# Patient Record
Sex: FEMALE | Race: BLACK OR AFRICAN AMERICAN | NOT HISPANIC OR LATINO | URBAN - METROPOLITAN AREA
[De-identification: names, ages, dates, MRNs, and addresses within clinical notes are randomized per-mention and may not be internally consistent; named-entity substitution may affect disease eponyms.]

---

## 2021-06-23 ENCOUNTER — EMERGENCY (EMERGENCY)
Facility: HOSPITAL | Age: 31
LOS: 0 days | Discharge: HOME | End: 2021-06-23
Attending: EMERGENCY MEDICINE | Admitting: EMERGENCY MEDICINE
Payer: MEDICAID

## 2021-06-23 VITALS
TEMPERATURE: 97 F | HEART RATE: 82 BPM | OXYGEN SATURATION: 100 % | DIASTOLIC BLOOD PRESSURE: 86 MMHG | SYSTOLIC BLOOD PRESSURE: 138 MMHG | RESPIRATION RATE: 18 BRPM

## 2021-06-23 DIAGNOSIS — T74.11XA ADULT PHYSICAL ABUSE, CONFIRMED, INITIAL ENCOUNTER: ICD-10-CM
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
## 2021-06-23 PROCEDURE — 99283 EMERGENCY DEPT VISIT LOW MDM: CPT

## 2021-06-23 RX ORDER — ACETAMINOPHEN 500 MG
1500 TABLET ORAL ONCE
Refills: 0 | Status: COMPLETED | OUTPATIENT
Start: 2021-06-23 | End: 2021-06-23

## 2021-06-23 RX ADMIN — Medication 1500 MILLIGRAM(S): at 05:37
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
## 2021-06-23 NOTE — ED PROVIDER NOTE - PATIENT PORTAL LINK FT
You can access the FollowMyHealth Patient Portal offered by [facility] by registering at the following website: [email or web address]. By joining [employer]’s FollowMyHealth portal, you will also be able to view your health information using other applications (apps) compatible with our system.

## 2021-06-23 NOTE — ED PROVIDER NOTE - NS ED ROS FT
Review of Systems:  	•	CONSTITUTIONAL: no fever, no chills  	•	SKIN: no rash  	•	EYES: no eye pain, no blurry vision  	•	ENT: no change in hearing, no tinnitus   	•	RESPIRATORY: no shortness of breath, no cough  	•	CARDIAC: no chest pain, no palpitations  	•	GI: no nausea, no vomiting   	•	GENITO-URINARY: no discharge, no dysuria; no hematuria, no increased urinary frequency  	•	MUSCULOSKELETAL: no joint paint, no swelling, no redness  	•	NEUROLOGIC: +head injury, no loc, no numbness/paresthesias   	•	PSYCH: no anxiety, non suicidal, non homicidal, no hallucination, no depression
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
## 2021-06-23 NOTE — ED PROVIDER NOTE - PHYSICAL EXAMINATION
CONSTITUTIONAL: Well-developed; well-nourished; in no acute distress, nontoxic appearing  SKIN: skin exam is warm and dry  HEAD: Normocephalic; atraumatic.  EYES: PERRL, 3 mm bilateral, no nystagmus, EOM intact; conjunctiva and sclera clear.  ENT: MMM  NECK: ROM intact. No midline tenderness.   CARD: S1, S2 normal, no murmur  RESP: No wheezes, rales or rhonchi. Good air movement bilaterally  EXT: Normal ROM.   NEURO: awake, alert, following commands, oriented, grossly unremarkable. No Focal deficits. GCS 15.   PSYCH: Cooperative, appropriate.
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
## 2021-06-23 NOTE — ED PROVIDER NOTE - NSFOLLOWUPCLINICS_GEN_ALL_ED_FT
[facility]  Medicine  [address], [place]   Phone: [phone]  Fax:   Follow Up Time: Routine

## 2022-08-29 ENCOUNTER — APPOINTMENT (OUTPATIENT)
Dept: PSYCHIATRY | Facility: CLINIC | Age: 32
End: 2022-08-29

## 2022-08-29 ENCOUNTER — OUTPATIENT (OUTPATIENT)
Dept: OUTPATIENT SERVICES | Facility: HOSPITAL | Age: 32
LOS: 1 days | Discharge: HOME | End: 2022-08-29

## 2022-08-29 DIAGNOSIS — F41.9 ANXIETY DISORDER, UNSPECIFIED: ICD-10-CM

## 2022-08-29 PROBLEM — Z00.00 ENCOUNTER FOR PREVENTIVE HEALTH EXAMINATION: Status: ACTIVE | Noted: 2022-08-29

## 2022-09-28 ENCOUNTER — OUTPATIENT (OUTPATIENT)
Dept: OUTPATIENT SERVICES | Facility: HOSPITAL | Age: 32
LOS: 1 days | Discharge: HOME | End: 2022-09-28

## 2022-09-28 ENCOUNTER — APPOINTMENT (OUTPATIENT)
Dept: PSYCHIATRY | Facility: CLINIC | Age: 32
End: 2022-09-28

## 2022-09-28 DIAGNOSIS — F41.9 ANXIETY DISORDER, UNSPECIFIED: ICD-10-CM

## 2022-09-28 PROCEDURE — 90792 PSYCH DIAG EVAL W/MED SRVCS: CPT

## 2022-10-12 VITALS — WEIGHT: 200 LBS | HEIGHT: 65 IN | BODY MASS INDEX: 33.32 KG/M2

## 2022-10-14 ENCOUNTER — OUTPATIENT (OUTPATIENT)
Dept: OUTPATIENT SERVICES | Facility: HOSPITAL | Age: 32
LOS: 1 days | Discharge: HOME | End: 2022-10-14

## 2022-10-14 ENCOUNTER — APPOINTMENT (OUTPATIENT)
Dept: PSYCHIATRY | Facility: CLINIC | Age: 32
End: 2022-10-14

## 2022-10-14 DIAGNOSIS — F41.9 ANXIETY DISORDER, UNSPECIFIED: ICD-10-CM
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
## 2022-10-14 NOTE — PHYSICAL EXAM
[Cooperative] : cooperative [Depressed] : depressed [Clear] : clear [Linear/Goal Directed] : linear/goal directed [None Reported] : none reported [WNL] : within normal limits [de-identified] : UNABLE TO ASSESS [de-identified] : UNABLE TO ASSESS
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
## 2022-10-14 NOTE — ADDENDUM
[FreeTextEntry1] : PLAN: PT IS ADVISED OF EMERGENCY NUMBERS AND SAFETY PLAN IS DISCUSSED. sHE WILL RETURN TO SESSION IN 1 WEEK

## 2022-10-21 ENCOUNTER — APPOINTMENT (OUTPATIENT)
Dept: PSYCHIATRY | Facility: CLINIC | Age: 32
End: 2022-10-21

## 2022-10-21 ENCOUNTER — OUTPATIENT (OUTPATIENT)
Dept: OUTPATIENT SERVICES | Facility: HOSPITAL | Age: 32
LOS: 1 days | Discharge: HOME | End: 2022-10-21

## 2022-10-21 DIAGNOSIS — F41.9 ANXIETY DISORDER, UNSPECIFIED: ICD-10-CM

## 2022-10-21 NOTE — REASON FOR VISIT
[Home] : at home, [unfilled] , at the time of the visit. [Other Location: e.g. Home (Enter Location, City,State)___] : at [unfilled] [Patient] : Patient [FreeTextEntry1] : PSYCHOTHERAPY FOLLOW UP

## 2022-10-21 NOTE — RISK ASSESSMENT
[No, patient denies ideation or behavior] : No, patient denies ideation or behavior [FreeTextEntry9] : NEGATIVE RISK ASSESSMENT AT THIS TIME

## 2022-10-21 NOTE — PLAN
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

## 2022-10-21 NOTE — PHYSICAL EXAM
[Cooperative] : cooperative [Depressed] : depressed [Clear] : clear [Linear/Goal Directed] : linear/goal directed [None Reported] : none reported [WNL] : within normal limits [Average] : average [de-identified] : UNABLE TO ASSESS [de-identified] : UNABLE TO ASSESS

## 2022-10-26 ENCOUNTER — APPOINTMENT (OUTPATIENT)
Dept: PSYCHIATRY | Facility: CLINIC | Age: 32
End: 2022-10-26

## 2022-10-26 ENCOUNTER — OUTPATIENT (OUTPATIENT)
Dept: OUTPATIENT SERVICES | Facility: HOSPITAL | Age: 32
LOS: 1 days | Discharge: HOME | End: 2022-10-26

## 2022-10-26 DIAGNOSIS — F41.9 ANXIETY DISORDER, UNSPECIFIED: ICD-10-CM

## 2022-10-26 PROCEDURE — 99214 OFFICE O/P EST MOD 30 MIN: CPT | Mod: 95

## 2022-10-26 NOTE — BEHAVIORAL HEALTH
[Prevent physical harm to patient or another individual] : Prevent physical harm to patient or another individual
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
## 2022-10-26 NOTE — CURRENT PSYCHIATRIC SYMPTOMS
[Anhedonia] : anhedonia [Decreased Concentration] : decreased concentrating ability [Euphoria] : euphoria [Highly Irritable] : high irritability [Increased Activity] : increased activity [Distractibility] : distractibility [Talkativeness] : talkativeness [Grandeur] : feelings of grandeur [Buying Sprees] : buying sprees [Hypersexuality] : hypersexuality [Dec Need For Sleep] : decreased need for sleep [Excessive Worry] : excessive worry [Ruminations] : ruminations [Re-experiencing] : re-experiencing [Restlessness] : restlessness [Panic] : panic  [de-identified] : denies, but seems sad [de-identified] : pt denies [de-identified] : denies
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
## 2022-10-26 NOTE — REASON FOR VISIT
[Evaluation] : evaluation of [FreeTextEntry1] : Bipolar disorder/Anxiety, Depression.  [FreeTextEntry2] : Self

## 2022-10-26 NOTE — FAMILY HISTORY
[FreeTextEntry1] : Family composition: Lives alone in a studio apartment. Never  , no kids. \par Family history and background: Born and raised in [place]. Father passed away when she was a little girl, mother is alive. Older brother 45 y/o, and her sister is 51 y/o. \par Family relationship: Good with mother, close relationship with her siblings.  \par Pertinent Family Medical, MH and Substance Use History including Adult Child of Alcoholic and child of substance abuse status; history of cancer and heart disease:\par \par Father: passed away due to a car accident years ago. \par Mother 65 y/o: cholesterol, diabetes. \par \par \par

## 2022-10-26 NOTE — DISCUSSION/SUMMARY
[Low acute suicide risk] : Low acute suicide risk [Yes] : Safety Plan completed/updated (for individuals at risk): Yes [FreeTextEntry3] : safety plan was d/w pt [FreeTextEntry1] : \par \par \par
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
## 2022-10-26 NOTE — PHYSICAL EXAM
[Intermittent] : intermittent [Slowed] : slowed [Euthymic] : euthymic [Constricted] : constricted [Clear] : clear [Poverty of content] : poverty of content [WNL] : within normal limits [None Reported] : none reported [Fund of knowledge] : fund of knowledge [Severe] : severe [None] : none [FreeTextEntry1] : A bit tense  [FreeTextEntry5] : guarded [de-identified] : denies [de-identified] : poor

## 2022-10-27 ENCOUNTER — OUTPATIENT (OUTPATIENT)
Dept: OUTPATIENT SERVICES | Facility: HOSPITAL | Age: 32
LOS: 1 days | Discharge: HOME | End: 2022-10-27

## 2022-10-27 ENCOUNTER — APPOINTMENT (OUTPATIENT)
Dept: PSYCHIATRY | Facility: CLINIC | Age: 32
End: 2022-10-27

## 2022-10-27 DIAGNOSIS — F41.9 ANXIETY DISORDER, UNSPECIFIED: ICD-10-CM

## 2022-10-27 NOTE — PHYSICAL EXAM
[Cooperative] : cooperative [Euthymic] : euthymic [Constricted] : constricted [Clear] : clear [Linear/Goal Directed] : linear/goal directed [None Reported] : none reported [WNL] : within normal limits [Average] : average [de-identified] : UNABLE TO ASSESS [de-identified] : UNABLE TO ASSESS

## 2022-10-27 NOTE — RISK ASSESSMENT
[No, patient denies ideation or behavior] : No, patient denies ideation or behavior [FreeTextEntry9] : NO RISK TO SELF OR OTHERS IS ELICITED AT THIS TIME

## 2022-10-27 NOTE — ADDENDUM
[FreeTextEntry1] : PLAN: weekly therapy continues ..next session scheduled for 11/4\par Pt is to call with any changes or concerns

## 2022-10-27 NOTE — PLAN
[FreeTextEntry2] :    I WANT TO GET A JOB AND RETURN TO WORK [Psychodynamic Therapy] : Psychodynamic Therapy  [Supportive Therapy] : Supportive Therapy [de-identified] : BISI  endorses improved mood. 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   We discussed the possibility of connecting with  through this clinic vocational services. Bisi now prefers to search on her own at this time.\par In sesison Bisi reports her coping tools such as family support and spiritual connection have been instrumental in her recovery.\par She mentions her sister Vanesa  who "has done a lot for me."\par Bisi is very responsive to session and continues to request weekly sessions

## 2022-11-04 ENCOUNTER — APPOINTMENT (OUTPATIENT)
Dept: PSYCHIATRY | Facility: CLINIC | Age: 32
End: 2022-11-04

## 2022-11-04 ENCOUNTER — OUTPATIENT (OUTPATIENT)
Dept: OUTPATIENT SERVICES | Facility: HOSPITAL | Age: 32
LOS: 1 days | Discharge: HOME | End: 2022-11-04

## 2022-11-04 DIAGNOSIS — F41.9 ANXIETY DISORDER, UNSPECIFIED: ICD-10-CM

## 2022-11-08 ENCOUNTER — APPOINTMENT (OUTPATIENT)
Dept: PSYCHIATRY | Facility: CLINIC | Age: 32
End: 2022-11-08

## 2022-11-08 ENCOUNTER — OUTPATIENT (OUTPATIENT)
Dept: OUTPATIENT SERVICES | Facility: HOSPITAL | Age: 32
LOS: 1 days | Discharge: HOME | End: 2022-11-08

## 2022-11-08 DIAGNOSIS — F41.9 ANXIETY DISORDER, UNSPECIFIED: ICD-10-CM

## 2022-11-08 NOTE — RISK ASSESSMENT
[No, patient denies ideation or behavior] : No, patient denies ideation or behavior [FreeTextEntry9] : NEGATIVE RISK TO SELF OR OTHERS IS ELICITED AT THIS TIME

## 2022-11-15 ENCOUNTER — APPOINTMENT (OUTPATIENT)
Dept: PSYCHIATRY | Facility: CLINIC | Age: 32
End: 2022-11-15

## 2022-11-15 ENCOUNTER — OUTPATIENT (OUTPATIENT)
Dept: OUTPATIENT SERVICES | Facility: HOSPITAL | Age: 32
LOS: 1 days | Discharge: HOME | End: 2022-11-15

## 2022-11-15 DIAGNOSIS — F41.9 ANXIETY DISORDER, UNSPECIFIED: ICD-10-CM

## 2022-11-15 NOTE — PLAN
[FreeTextEntry2] : SEE DISCUSSION/SUMMARY [Psychodynamic Therapy] : Psychodynamic Therapy  [Skills training (all types)] : Skills training (all types)  [de-identified] : EDE  ENDORSES STABLE MOOD AND IS RESPONSIVE AN D INTERACTIVE IN SESSION\par PT STATES THAT HER TRIGGERS TO EMOTIONAL EPISODES COME FROM BEING AROUND OTHER PEOPLE SO SHE MAINLY KEEPS TO HERSELF\par WE EXPLORED SOME GOALS AND HOW TO GRADUALLY EXPOSE HERSELF TO OTHERS WHO SHE CONSIDERS SAFE ...SHE WOULD LIKE TO JOIN LGBTQ SUPPORT GROUP BUT IS "NERVOUS"  \par WE DISCUSSED BEGINNING WITH A VIRTUAL SESSION.  EDE IS PROVIDED WITH VALIDATION AND SUPPORT AND AGREES TO CONSIDER THIS OPTION.\par AT THIS TIME SHE KEEPS BUSY WITH FAMILY AS WELL AS DAILY EXERCISE.

## 2022-11-15 NOTE — DISCUSSION/SUMMARY
[Initial Plan] : Initial Plan [Able to set and pursue goals] : able to set and pursue goals [Adherent to treatment recommendations] : adherent to treatment recommendations [Attempting to realize their potential] : Attempting to realize their potential [Awareness of substance use issues] : awareness of substance use issues [Intelligent] : intelligent [Motivated to participate in treatment] : motivated to participate in treatment [Part of a supportive family] : part of a supportive family [Involved in cultural/spiritual/Hoahaoism/community activities] : involved in cultural/spiritual/Hoahaoism/community activities [Connected to healthcare] : connected to healthcare [FreeTextEntry1] : 11/8/2022 [FreeTextEntry2] : 11/8/2023 [FreeTextEntry3] : IN CHART [FreeTextEntry7] : PT IS RESPONSIVE AND COMPLIANT WITH TREATMENT [FreeTextEntry8] : NONE SEEN AT THIS TIME [FreeTextEntry9] : POSITIVE CONNECTION TO SPIRITUALITY [de-identified] : PSYCHIATRIST [Mental Health] : Mental Health [Interpersonal Relationships] : Interpersonal Relationships [Initial] : Initial [every ___ weeks] : every [unfilled] weeks [None - Reason others did not participate:] : None - Reason others did not participate:  [Yes] : Yes [Psychiatric Provider/Prescriber] : Psychiatric Provider/Prescriber [Therapist] : Therapist [FreeTextEntry4] : I WANT TO MANAGE MY ANGER WHICH CAUSES ME TO BE IMPULSIVE AND GET INTO TROUBLE [de-identified] : USE [facility] FOR MEDIITATION  AND EXERCISE [de-identified] : 11/8/2023 [de-identified] : USES [facility] [de-identified] : I WANT TO LOOK FOR WORK [de-identified] : 11/08/2023 [de-identified] : FEELS READY FOR RETURN TO WORK [FreeTextEntry5] : ACTIVE LISTENING AND WEEKLY THERAPY [de-identified] : STABLE MOOD AND RETURN TO WORK [de-identified] : NOT CLINICALLY INDICTATED

## 2022-11-18 ENCOUNTER — APPOINTMENT (OUTPATIENT)
Dept: PSYCHIATRY | Facility: CLINIC | Age: 32
End: 2022-11-18

## 2022-11-29 ENCOUNTER — APPOINTMENT (OUTPATIENT)
Dept: PSYCHIATRY | Facility: CLINIC | Age: 32
End: 2022-11-29

## 2022-11-29 ENCOUNTER — OUTPATIENT (OUTPATIENT)
Dept: OUTPATIENT SERVICES | Facility: HOSPITAL | Age: 32
LOS: 1 days | Discharge: HOME | End: 2022-11-29

## 2022-11-29 DIAGNOSIS — F41.9 ANXIETY DISORDER, UNSPECIFIED: ICD-10-CM

## 2022-11-29 NOTE — PHYSICAL EXAM
[Cooperative] : cooperative [Euthymic] : euthymic [Constricted] : constricted [Clear] : clear [Linear/Goal Directed] : linear/goal directed [None Reported] : none reported [Average] : average [WNL] : within normal limits [de-identified] : PT ENDORSES ONGOING STABLE MOOD

## 2022-11-29 NOTE — DISCUSSION/SUMMARY
[Initial Plan] : Initial Plan [Able to set and pursue goals] : able to set and pursue goals [Adherent to treatment recommendations] : adherent to treatment recommendations [Attempting to realize their potential] : Attempting to realize their potential [Awareness of substance use issues] : awareness of substance use issues [Intelligent] : intelligent [Motivated to participate in treatment] : motivated to participate in treatment [Part of a supportive family] : part of a supportive family [Involved in cultural/spiritual/Pentecostalism/community activities] : involved in cultural/spiritual/Pentecostalism/community activities [Connected to healthcare] : connected to healthcare [FreeTextEntry1] : 11/8/2022 [FreeTextEntry2] : 11/8/2023 [FreeTextEntry3] : IN CHART [FreeTextEntry5] : "I WANT TO GET A JOB AND RETURN TO WORK"  AND I NEED TO MANAGE MY MOODS [FreeTextEntry7] : PT IS RESPONSIVE AND COMPLIANT WITH TREATMENT [FreeTextEntry8] : NONE SEEN AT THIS TIME [FreeTextEntry9] : POSITIVE CONNECTION TO SPIRITUALITY [de-identified] : PSYCHIATRIST

## 2022-11-29 NOTE — PLAN
[FreeTextEntry2] : SEE DISCUSSION/SUMMARY [Cognitive and/or Behavior Therapy] : Cognitive and/or Behavior Therapy  [Skills training (all types)] : Skills training (all types)  [Supportive Therapy] : Supportive Therapy [de-identified] : EDE CONTINUES TO ENDORSE STABLE MOOD AND LOOKS FORWARD TO CELEBRATING HER BIRTHDAY THIS WEEKEND AT THE [employer] GAME WITH HER SIBLINGS.\par WE CONTINUES TO EXPLORE HOW TO USE GRADUAL EXPOSURE THERAPY TO MANAGE HER ANXIETY ABOUT ATTENDING [facility] GROUPS. \par EDE IS RESPONSIVE TO BEGINNING WITH A PHONE CALL\par EDE IS NOW IN A PROGRAM CALLED CRAN THROUGH THE COURT SYSTEM WHICH IS THE LAST STEP BEFORE BEING DISCHARGED FROM THE COURT (MANDATE FOR MENTAL HEALTH TREATMENT.)\par IN SESSION PT CONTINUES TO BE ALERT, INTERACTIVE AND FOCUSED   SHE IS PROVIDED WITH ENCOURAGEMENT NAD SUPPORT

## 2022-11-29 NOTE — RISK ASSESSMENT
[No, patient denies ideation or behavior] : No, patient denies ideation or behavior [FreeTextEntry9] : PT DENIES ANY RISK TO SELF OR OTHERS AT THIS TIME

## 2022-12-06 ENCOUNTER — APPOINTMENT (OUTPATIENT)
Dept: PSYCHIATRY | Facility: CLINIC | Age: 32
End: 2022-12-06

## 2022-12-08 ENCOUNTER — APPOINTMENT (OUTPATIENT)
Dept: PSYCHIATRY | Facility: CLINIC | Age: 32
End: 2022-12-08

## 2022-12-08 ENCOUNTER — OUTPATIENT (OUTPATIENT)
Dept: OUTPATIENT SERVICES | Facility: HOSPITAL | Age: 32
LOS: 1 days | Discharge: HOME | End: 2022-12-08

## 2022-12-08 DIAGNOSIS — F41.9 ANXIETY DISORDER, UNSPECIFIED: ICD-10-CM

## 2022-12-08 PROCEDURE — 99214 OFFICE O/P EST MOD 30 MIN: CPT | Mod: 95
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
## 2022-12-08 NOTE — FAMILY HISTORY
[FreeTextEntry1] : Family composition: Lives alone in a studio apartment. Never  , no kids. \par Family history and background: Born and raised in [place]. Father passed away when she was a little girl, mother is alive. Older brother 47 y/o, and her sister is 49 y/o. \par Family relationship: Good with mother, close relationship with her siblings.  \par Pertinent Family Medical, MH and Substance Use History including Adult Child of Alcoholic and child of substance abuse status; history of cancer and heart disease:\par \par Father: passed away due to a car accident years ago. \par Mother 67 y/o: cholesterol, diabetes. \par \par \par

## 2022-12-08 NOTE — CURRENT PSYCHIATRIC SYMPTOMS
[Anhedonia] : anhedonia [Decreased Concentration] : decreased concentrating ability [Euphoria] : euphoria [Highly Irritable] : high irritability [Increased Activity] : increased activity [Distractibility] : distractibility [Talkativeness] : talkativeness [Grandeur] : feelings of grandeur [Buying Sprees] : buying sprees [Hypersexuality] : hypersexuality [Dec Need For Sleep] : decreased need for sleep [Excessive Worry] : excessive worry [Ruminations] : ruminations [Re-experiencing] : re-experiencing [Restlessness] : restlessness [Panic] : panic  [de-identified] : denies, but seems sad [de-identified] : pt denies [de-identified] : denies
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
## 2022-12-08 NOTE — PHYSICAL EXAM
[Intermittent] : intermittent [Slowed] : slowed [Euthymic] : euthymic [Constricted] : constricted [Clear] : clear [Poverty of content] : poverty of content [WNL] : within normal limits [None] : none [None Reported] : none reported [Fund of knowledge] : fund of knowledge [Severe] : severe [FreeTextEntry1] : A bit more spontaneous [FreeTextEntry5] : less guarded [de-identified] : denies [de-identified] : poor

## 2022-12-13 ENCOUNTER — APPOINTMENT (OUTPATIENT)
Dept: PSYCHIATRY | Facility: CLINIC | Age: 32
End: 2022-12-13

## 2022-12-13 ENCOUNTER — OUTPATIENT (OUTPATIENT)
Dept: OUTPATIENT SERVICES | Facility: HOSPITAL | Age: 32
LOS: 1 days | Discharge: HOME | End: 2022-12-13

## 2022-12-13 DIAGNOSIS — F41.9 ANXIETY DISORDER, UNSPECIFIED: ICD-10-CM

## 2022-12-13 NOTE — DISCUSSION/SUMMARY
[Initial Plan] : Initial Plan [Able to set and pursue goals] : able to set and pursue goals [Adherent to treatment recommendations] : adherent to treatment recommendations [Attempting to realize their potential] : Attempting to realize their potential [Awareness of substance use issues] : awareness of substance use issues [Intelligent] : intelligent [Motivated to participate in treatment] : motivated to participate in treatment [Part of a supportive family] : part of a supportive family [Involved in cultural/spiritual/Anabaptist/community activities] : involved in cultural/spiritual/Anabaptist/community activities [Connected to healthcare] : connected to healthcare [FreeTextEntry1] : 11/8/2022 [FreeTextEntry2] : 11/8/2023 [FreeTextEntry3] : IN CHART [FreeTextEntry5] : "I WANT TO GET A JOB AND RETURN TO WORK"  AND I NEED TO MANAGE MY MOODS [FreeTextEntry7] : PT IS RESPONSIVE AND COMPLIANT WITH TREATMENT [FreeTextEntry8] : NONE SEEN AT THIS TIME [FreeTextEntry9] : POSITIVE CONNECTION TO SPIRITUALITY [de-identified] : PSYCHIATRIST

## 2022-12-13 NOTE — PHYSICAL EXAM
[Cooperative] : cooperative [Euthymic] : euthymic [Constricted] : constricted [Clear] : clear [Linear/Goal Directed] : linear/goal directed [None Reported] : none reported [Average] : average [WNL] : within normal limits [de-identified] : PT ENDORSES ONGOING STABLE MOOD
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
## 2022-12-13 NOTE — RISK ASSESSMENT
[No, patient denies ideation or behavior] : No, patient denies ideation or behavior [FreeTextEntry9] : NO RISK TO SELF OR OTHERS ELICITED

## 2022-12-20 ENCOUNTER — APPOINTMENT (OUTPATIENT)
Dept: PSYCHIATRY | Facility: CLINIC | Age: 32
End: 2022-12-20

## 2022-12-20 ENCOUNTER — OUTPATIENT (OUTPATIENT)
Dept: OUTPATIENT SERVICES | Facility: HOSPITAL | Age: 32
LOS: 1 days | Discharge: HOME | End: 2022-12-20

## 2022-12-20 DIAGNOSIS — F41.9 ANXIETY DISORDER, UNSPECIFIED: ICD-10-CM

## 2022-12-20 NOTE — PLAN
[FreeTextEntry2] : SEE DISCUSSION/SUMMARY [Psychodynamic Therapy] : Psychodynamic Therapy  [Skills training (all types)] : Skills training (all types)  [de-identified] : EDE ENDORSES POSITIVE MOOD AND SHARES AN EXPERIENCE OVER THE WEEKEND, WHEN SHE HAD A DATE\par EDE HAS ALSO VOICED PLANS TO JOIN A GYM AS SHE HAS NOT BEEN EATING HEALTHY AND WOULD LIME TO GET EXERCISE.\par EDE HAS ACTUALLY VOICED HER CONCERNS TO HER SISTER ABOUT EXPERIENCING HER AS "TALKING DOWN TO ME." SHE HAD AGREED TO WRITE A LETTER BUT DECIDED TO ACTUALLY TACKLE THIS.IN PERSON. SHE WAS HAPPY THAT HER SISTER RECEIVED THE MESSAGE IN A POSITIVE MANNER.\par EDE LOOKS FORWARD TO [place] WITH FAMILY\par .SHE IS ALERT, RESPONSIVE AND INTERACTIVE IN SESSION EDE IS PROVIDED WITH SUPPORT AND VALIDATION [FreeTextEntry1] : PLAN: NEXT SESSION IN 2 WEEKS DUE TO UPCOMING HOLIDAY

## 2022-12-20 NOTE — DISCUSSION/SUMMARY
[Initial Plan] : Initial Plan [Able to set and pursue goals] : able to set and pursue goals [Adherent to treatment recommendations] : adherent to treatment recommendations [Attempting to realize their potential] : Attempting to realize their potential [Awareness of substance use issues] : awareness of substance use issues [Intelligent] : intelligent [Motivated to participate in treatment] : motivated to participate in treatment [Part of a supportive family] : part of a supportive family [Involved in cultural/spiritual/Hoahaoism/community activities] : involved in cultural/spiritual/Hoahaoism/community activities [Connected to healthcare] : connected to healthcare [FreeTextEntry1] : 11/8/2022 [FreeTextEntry2] : 11/8/2023 [FreeTextEntry3] : IN CHART [FreeTextEntry5] : "I WANT TO GET A JOB AND RETURN TO WORK"  AND I NEED TO MANAGE MY MOODS [FreeTextEntry7] : PT IS RESPONSIVE AND COMPLIANT WITH TREATMENT [FreeTextEntry8] : NONE SEEN AT THIS TIME [FreeTextEntry9] : POSITIVE CONNECTION TO SPIRITUALITY [de-identified] : PSYCHIATRIST

## 2022-12-20 NOTE — PHYSICAL EXAM
[Cooperative] : cooperative [Euthymic] : euthymic [Constricted] : constricted [Clear] : clear [Linear/Goal Directed] : linear/goal directed [None Reported] : none reported [Average] : average [WNL] : within normal limits [de-identified] : PT ENDORSES ONGOING STABLE MOOD

## 2023-01-03 ENCOUNTER — APPOINTMENT (OUTPATIENT)
Dept: PSYCHIATRY | Facility: CLINIC | Age: 33
End: 2023-01-03

## 2023-01-03 ENCOUNTER — OUTPATIENT (OUTPATIENT)
Dept: OUTPATIENT SERVICES | Facility: HOSPITAL | Age: 33
LOS: 1 days | Discharge: HOME | End: 2023-01-03

## 2023-01-03 DIAGNOSIS — F41.9 ANXIETY DISORDER, UNSPECIFIED: ICD-10-CM

## 2023-01-03 NOTE — PLAN
[FreeTextEntry2] : SEE DISCUSSION/SUMMARY [Psychodynamic Therapy] : Psychodynamic Therapy  [Skills training (all types)] : Skills training (all types)  [de-identified] : EDE ENDORSES GOOD SLEEP AND STABLE MOOD. SHE REPORTS FEELING "BLAH"  WITH NO EVIDENCE OF MAJOR DEPRESSIVE SYMPTOMS\par SHE MAKES GOALS FOR THE NEW YEAR OF BEING MORE CONSISTENT AND STRUCTURED. SHE WOULD LIKE TO EXERCISE REGULARLY AND IS AGREEABLE TO CREATING A REALISTIC SCHEDULE FOR HER WORKOUTS\par EDE HAS NOT BEEN SUCCESSFUL IN MANAGING A SECOND DATE WITH RECENT ROMANTIC CONTACT  AND FEELS SH E STRUGGLES WITH COMMUNICATION   WE DISCUSSED HOW TO EITHER IMPROVE COMMUNICATION OR MOVE FORWARD TO ANOTHER POSSIBLE DATING PARTNER WHO WILL BE MORE RESPONSIVE\par IN SESSION EDE IS ALERT, INTERACTIVE AND COMPLIANT WITH APPOINTMENTS AS WELL AS MEDICATION. SHE IS PROVIDED WITH [employer][PORT\par  [FreeTextEntry1] : PLAN: WEEKLY THERAPY CONTINUES UNTIL NEXT COURT DATE AT END OF JANUARY AT WHICH TIME PT NEEDS WILL BE REEVALUATED

## 2023-01-03 NOTE — RISK ASSESSMENT
[No, patient denies ideation or behavior] : No, patient denies ideation or behavior [FreeTextEntry9] : NO RISK TO SELF OR OTHERS ELICITED AT THIS TIME

## 2023-01-03 NOTE — DISCUSSION/SUMMARY
[Initial Plan] : Initial Plan [Able to set and pursue goals] : able to set and pursue goals [Adherent to treatment recommendations] : adherent to treatment recommendations [Attempting to realize their potential] : Attempting to realize their potential [Awareness of substance use issues] : awareness of substance use issues [Intelligent] : intelligent [Motivated to participate in treatment] : motivated to participate in treatment [Part of a supportive family] : part of a supportive family [Involved in cultural/spiritual/Temple/community activities] : involved in cultural/spiritual/Temple/community activities [Connected to healthcare] : connected to healthcare [FreeTextEntry1] : 11/8/2022 [FreeTextEntry2] : 11/8/2023 [FreeTextEntry3] : IN CHART [FreeTextEntry5] : "I WANT TO GET A JOB AND RETURN TO WORK"  AND I NEED TO MANAGE MY MOODS [FreeTextEntry7] : PT IS RESPONSIVE AND COMPLIANT WITH TREATMENT [FreeTextEntry8] : NONE SEEN AT THIS TIME [FreeTextEntry9] : POSITIVE CONNECTION TO SPIRITUALITY [de-identified] : PSYCHIATRIST

## 2023-01-03 NOTE — PHYSICAL EXAM
[Cooperative] : cooperative [Euthymic] : euthymic [Constricted] : constricted [Clear] : clear [Linear/Goal Directed] : linear/goal directed [None Reported] : none reported [Average] : average [WNL] : within normal limits [de-identified] : PT ENDORSES ONGOING STABLE MOOD

## 2023-01-03 NOTE — REASON FOR VISIT
[Home] : at home, [unfilled] , at the time of the visit. [Patient] : Patient [FreeTextEntry1] : PSYCHOTHERAPY FOLLOW UP

## 2023-01-10 ENCOUNTER — APPOINTMENT (OUTPATIENT)
Dept: PSYCHIATRY | Facility: CLINIC | Age: 33
End: 2023-01-10

## 2023-01-19 ENCOUNTER — OUTPATIENT (OUTPATIENT)
Dept: OUTPATIENT SERVICES | Facility: HOSPITAL | Age: 33
LOS: 1 days | Discharge: HOME | End: 2023-01-19

## 2023-01-19 ENCOUNTER — APPOINTMENT (OUTPATIENT)
Dept: PSYCHIATRY | Facility: CLINIC | Age: 33
End: 2023-01-19

## 2023-01-19 DIAGNOSIS — F41.9 ANXIETY DISORDER, UNSPECIFIED: ICD-10-CM

## 2023-01-19 NOTE — REASON FOR VISIT
[Patient preference] : as per patient preference [Continuing, patient not seen in-person within last 12 months (provide details below)] : Telehealth services are continuing, patient not seen in-person within last 12 months.  [Telehealth (audio & video) - Individual/Group] : This visit was provided via telehealth using real-time 2-way audio visual technology. [Other Location: e.g. Home (Enter Location, City,State)___] : The provider was located at [unfilled]. [Home] : The patient, [unfilled], was located at home, [unfilled], at the time of the visit. [Verbal consent obtained from patient/other participant(s)] : Verbal consent for telehealth/telephonic services obtained from patient/other participant(s) [FreeTextEntry4] : 11:30 AM [FreeTextEntry5] : 12 noon [Patient] : Patient [FreeTextEntry1] : PSYCHOTHERAPY FOLLOW UP
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
## 2023-01-19 NOTE — DISCUSSION/SUMMARY
[Initial Plan] : Initial Plan [Able to set and pursue goals] : able to set and pursue goals [Adherent to treatment recommendations] : adherent to treatment recommendations [Attempting to realize their potential] : Attempting to realize their potential [Awareness of substance use issues] : awareness of substance use issues [Intelligent] : intelligent [Motivated to participate in treatment] : motivated to participate in treatment [Part of a supportive family] : part of a supportive family [Involved in cultural/spiritual/Rastafari/community activities] : involved in cultural/spiritual/Rastafari/community activities [Connected to healthcare] : connected to healthcare [FreeTextEntry1] : 11/8/2022 [FreeTextEntry2] : 11/8/2023 [FreeTextEntry3] : IN CHART [FreeTextEntry5] : "I WANT TO GET A JOB AND RETURN TO WORK"  AND I NEED TO MANAGE MY MOODS [FreeTextEntry7] : PT IS RESPONSIVE AND COMPLIANT WITH TREATMENT [FreeTextEntry8] : NONE SEEN AT THIS TIME [FreeTextEntry9] : POSITIVE CONNECTION TO SPIRITUALITY [de-identified] : PSYCHIATRIST

## 2023-01-19 NOTE — PHYSICAL EXAM
[Cooperative] : cooperative [Euthymic] : euthymic [Constricted] : constricted [Clear] : clear [Linear/Goal Directed] : linear/goal directed [None Reported] : none reported [Average] : average [WNL] : within normal limits [de-identified] : PT ENDORSES ONGOING STABLE MOOD

## 2023-01-24 ENCOUNTER — APPOINTMENT (OUTPATIENT)
Dept: PSYCHIATRY | Facility: CLINIC | Age: 33
End: 2023-01-24

## 2023-01-24 ENCOUNTER — OUTPATIENT (OUTPATIENT)
Dept: OUTPATIENT SERVICES | Facility: HOSPITAL | Age: 33
LOS: 1 days | Discharge: HOME | End: 2023-01-24

## 2023-01-24 DIAGNOSIS — F41.9 ANXIETY DISORDER, UNSPECIFIED: ICD-10-CM

## 2023-01-24 NOTE — RISK ASSESSMENT
[No, patient denies ideation or behavior] : No, patient denies ideation or behavior [FreeTextEntry9] : Pt denies risk to self or others at this time

## 2023-01-24 NOTE — DISCUSSION/SUMMARY
[Initial Plan] : Initial Plan [Able to set and pursue goals] : able to set and pursue goals [Adherent to treatment recommendations] : adherent to treatment recommendations [Attempting to realize their potential] : Attempting to realize their potential [Awareness of substance use issues] : awareness of substance use issues [Intelligent] : intelligent [Motivated to participate in treatment] : motivated to participate in treatment [Part of a supportive family] : part of a supportive family [Involved in cultural/spiritual/Roman Catholic/community activities] : involved in cultural/spiritual/Roman Catholic/community activities [Connected to healthcare] : connected to healthcare [FreeTextEntry1] : 11/8/2022 [FreeTextEntry2] : 11/8/2023 [FreeTextEntry3] : IN CHART [FreeTextEntry5] : "I WANT TO GET A JOB AND RETURN TO WORK"  AND I NEED TO MANAGE MY MOODS [FreeTextEntry7] : PT IS RESPONSIVE AND COMPLIANT WITH TREATMENT [FreeTextEntry8] : NONE SEEN AT THIS TIME [FreeTextEntry9] : POSITIVE CONNECTION TO SPIRITUALITY [de-identified] : PSYCHIATRIST

## 2023-01-24 NOTE — PHYSICAL EXAM
[Cooperative] : cooperative [Euthymic] : euthymic [Flat] : flat [Clear] : clear [Linear/Goal Directed] : linear/goal directed [None Reported] : none reported [Average] : average [WNL] : within normal limits [de-identified] : PT ENDORSES ONGOING STABLE MOOD

## 2023-01-24 NOTE — REASON FOR VISIT
[Patient preference] : as per patient preference [Continuing, patient not seen in-person within last 12 months (provide details below)] : Telehealth services are continuing, patient not seen in-person within last 12 months.  [Other Location: e.g. Home (Enter Location, City,State)___] : The provider was located at [unfilled]. [Home] : The patient, [unfilled], was located at home, [unfilled], at the time of the visit. [FreeTextEntry4] : 10:15 am [FreeTextEntry5] : 10:45 am [Patient] : Patient [FreeTextEntry1] : psychotherapy follow up

## 2023-01-24 NOTE — PLAN
[FreeTextEntry2] : SEE DISCUSSION/SUMMARY [Psychodynamic Therapy] : Psychodynamic Therapy  [Skills training (all types)] : Skills training (all types)  [Supportive Therapy] : Supportive Therapy [de-identified] : Bisi denies any new concerns and is somewhat apprehensive about her upcoming court hearing tomorrow\par Bisi is provided with support and encouragement\par As one of the requirement of her court mandate is to look for work, she is again informed about our Vocational services and is encouraged to consider assistance from this Service\par Bisi has increase her exercise and uses running and punching bag for assistance with mood   She reports she has been eating healthy and has lost some weight\par Bisi is provided with validation and active listening     Coping skills reviewed and PT is responsive to interventions [FreeTextEntry1] : PLAN: Next session scheduled for 2 weeks due to therapists Vacation next week

## 2023-02-07 ENCOUNTER — APPOINTMENT (OUTPATIENT)
Dept: PSYCHIATRY | Facility: CLINIC | Age: 33
End: 2023-02-07

## 2023-02-07 ENCOUNTER — APPOINTMENT (OUTPATIENT)
Age: 33
End: 2023-02-07

## 2023-02-07 ENCOUNTER — OUTPATIENT (OUTPATIENT)
Dept: OUTPATIENT SERVICES | Facility: HOSPITAL | Age: 33
LOS: 1 days | End: 2023-02-07
Payer: COMMERCIAL

## 2023-02-07 DIAGNOSIS — F33.1 MAJOR DEPRESSIVE DISORDER, RECURRENT, MODERATE: ICD-10-CM

## 2023-02-07 PROCEDURE — 90832 PSYTX W PT 30 MINUTES: CPT | Mod: 95

## 2023-02-07 NOTE — REASON FOR VISIT
[Patient preference] : as per patient preference [Continuing, patient not seen in-person within last 12 months (provide details below)] : Telehealth services are continuing, patient not seen in-person within last 12 months.  [Telehealth (audio & video) - Individual/Group] : This visit was provided via telehealth using real-time 2-way audio visual technology. [Verbal consent obtained from patient/other participant(s)] : Verbal consent for telehealth/telephonic services obtained from patient/other participant(s) [FreeTextEntry4] : 11:00 am [FreeTextEntry5] : 11:30 am [Patient] : Patient [FreeTextEntry1] : Psychotherapy follow up

## 2023-02-07 NOTE — RISK ASSESSMENT
[No, patient denies ideation or behavior] : No, patient denies ideation or behavior [FreeTextEntry9] : No risk to self or other elicited

## 2023-02-07 NOTE — PLAN
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

## 2023-02-07 NOTE — PHYSICAL EXAM
[Cooperative] : cooperative [Euthymic] : euthymic [Flat] : flat [Clear] : clear [Linear/Goal Directed] : linear/goal directed [None Reported] : none reported [Average] : average [WNL] : within normal limits [de-identified] : improved response [de-identified] : PT ENDORSES ONGOING STABLE MOOD

## 2023-02-07 NOTE — DISCUSSION/SUMMARY
[Initial Plan] : Initial Plan [Able to set and pursue goals] : able to set and pursue goals [Adherent to treatment recommendations] : adherent to treatment recommendations [Attempting to realize their potential] : Attempting to realize their potential [Awareness of substance use issues] : awareness of substance use issues [Intelligent] : intelligent [Motivated to participate in treatment] : motivated to participate in treatment [Part of a supportive family] : part of a supportive family [Involved in cultural/spiritual/Amish/community activities] : involved in cultural/spiritual/Amish/community activities [Connected to healthcare] : connected to healthcare [FreeTextEntry1] : 11/8/2022 [FreeTextEntry2] : 11/8/2023 [FreeTextEntry3] : IN CHART [FreeTextEntry5] : "I WANT TO GET A JOB AND RETURN TO WORK"  AND I NEED TO MANAGE MY MOODS [FreeTextEntry7] : PT IS RESPONSIVE AND COMPLIANT WITH TREATMENT [FreeTextEntry8] : NONE SEEN AT THIS TIME [FreeTextEntry9] : POSITIVE CONNECTION TO SPIRITUALITY [de-identified] : PSYCHIATRIST

## 2023-02-14 ENCOUNTER — APPOINTMENT (OUTPATIENT)
Dept: PSYCHIATRY | Facility: CLINIC | Age: 33
End: 2023-02-14

## 2023-02-14 ENCOUNTER — OUTPATIENT (OUTPATIENT)
Dept: OUTPATIENT SERVICES | Facility: HOSPITAL | Age: 33
LOS: 1 days | End: 2023-02-14
Payer: COMMERCIAL

## 2023-02-14 DIAGNOSIS — F33.1 MAJOR DEPRESSIVE DISORDER, RECURRENT, MODERATE: ICD-10-CM

## 2023-02-14 PROCEDURE — 90832 PSYTX W PT 30 MINUTES: CPT | Mod: 95

## 2023-02-14 NOTE — REASON FOR VISIT
[Patient preference] : as per patient preference [Continuing, patient not seen in-person within last 12 months (provide details below)] : Telehealth services are continuing, patient not seen in-person within last 12 months.  [Telehealth (audio & video) - Individual/Group] : This visit was provided via telehealth using real-time 2-way audio visual technology. [Other Location: e.g. Home (Enter Location, City,State)___] : The provider was located at [unfilled]. [Home] : The patient, [unfilled], was located at home, [unfilled], at the time of the visit. [Verbal consent obtained from patient/other participant(s)] : Verbal consent for telehealth/telephonic services obtained from patient/other participant(s) [FreeTextEntry4] : 10:15 am [FreeTextEntry5] : 10:45 am [Patient] : Patient [FreeTextEntry1] : Psychotherapy follow up

## 2023-02-14 NOTE — PLAN
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 She is provided with support and validation [FreeTextEntry1] : PLAN: NExt session in 2 weeks

## 2023-02-14 NOTE — RISK ASSESSMENT
[No, patient denies ideation or behavior] : No, patient denies ideation or behavior [FreeTextEntry9] : No risk to self or others

## 2023-02-14 NOTE — DISCUSSION/SUMMARY
[Initial Plan] : Initial Plan [Able to set and pursue goals] : able to set and pursue goals [Adherent to treatment recommendations] : adherent to treatment recommendations [Attempting to realize their potential] : Attempting to realize their potential [Awareness of substance use issues] : awareness of substance use issues [Intelligent] : intelligent [Motivated to participate in treatment] : motivated to participate in treatment [Part of a supportive family] : part of a supportive family [Involved in cultural/spiritual/Druze/community activities] : involved in cultural/spiritual/Druze/community activities [Connected to healthcare] : connected to healthcare [FreeTextEntry1] : 11/8/2022 [FreeTextEntry2] : 11/8/2023 [FreeTextEntry3] : IN CHART [FreeTextEntry5] : "I WANT TO GET A JOB AND RETURN TO WORK"  AND I NEED TO MANAGE MY MOODS [FreeTextEntry7] : PT IS RESPONSIVE AND COMPLIANT WITH TREATMENT [FreeTextEntry8] : NONE SEEN AT THIS TIME [FreeTextEntry9] : POSITIVE CONNECTION TO SPIRITUALITY [de-identified] : PSYCHIATRIST

## 2023-02-14 NOTE — PHYSICAL EXAM
[Cooperative] : cooperative [Euthymic] : euthymic [Full] : full [Clear] : clear [Linear/Goal Directed] : linear/goal directed [None Reported] : none reported [Average] : average [WNL] : within normal limits [de-identified] : improved response [de-identified] : PT ENDORSES ONGOING STABLE MOOD

## 2023-02-28 ENCOUNTER — OUTPATIENT (OUTPATIENT)
Dept: OUTPATIENT SERVICES | Facility: HOSPITAL | Age: 33
LOS: 1 days | End: 2023-02-28
Payer: COMMERCIAL

## 2023-02-28 ENCOUNTER — APPOINTMENT (OUTPATIENT)
Dept: PSYCHIATRY | Facility: CLINIC | Age: 33
End: 2023-02-28

## 2023-02-28 DIAGNOSIS — F33.1 MAJOR DEPRESSIVE DISORDER, RECURRENT, MODERATE: ICD-10-CM

## 2023-02-28 DIAGNOSIS — F31.9 BIPOLAR DISORDER, UNSPECIFIED: ICD-10-CM

## 2023-02-28 PROCEDURE — 90832 PSYTX W PT 30 MINUTES: CPT | Mod: 95

## 2023-02-28 NOTE — REASON FOR VISIT
[Patient preference] : as per patient preference [Continuing, patient not seen in-person within last 12 months (provide details below)] : Telehealth services are continuing, patient not seen in-person within last 12 months.  [Telehealth (audio & video) - Individual/Group] : This visit was provided via telehealth using real-time 2-way audio visual technology. [Other Location: e.g. Home (Enter Location, City,State)___] : The provider was located at [unfilled]. [Home] : The patient, [unfilled], was located at home, [unfilled], at the time of the visit. [Verbal consent obtained from patient/other participant(s)] : Verbal consent for telehealth/telephonic services obtained from patient/other participant(s) [Patient] : Patient [FreeTextEntry4] : 10:15 am [FreeTextEntry5] : 10:45 am [FreeTextEntry1] : psychotherapy follow up

## 2023-02-28 NOTE — PHYSICAL EXAM
[Constricted] : constricted [de-identified] : UNABLE TO ASSESS [de-identified] : UNABLE TO ASSESS [Cooperative] : cooperative [Euthymic] : euthymic [Full] : full [Clear] : clear [Linear/Goal Directed] : linear/goal directed [None Reported] : none reported [Average] : average [WNL] : within normal limits [de-identified] :  Pt has become more responsive and affect is full [de-identified] : PT ENDORSES ONGOING STABLE MOOD

## 2023-02-28 NOTE — PHYSICAL EXAM
[Constricted] : constricted [de-identified] : UNABLE TO ASSESS [de-identified] : UNABLE TO ASSESS [Cooperative] : cooperative [Euthymic] : euthymic [Full] : full [Clear] : clear [Linear/Goal Directed] : linear/goal directed [None Reported] : none reported [Average] : average [WNL] : within normal limits [de-identified] :  Pt has become more responsive and affect is full [de-identified] : PT ENDORSES ONGOING STABLE MOOD

## 2023-02-28 NOTE — PHYSICAL EXAM
[Constricted] : constricted [de-identified] : UNABLE TO ASSESS [de-identified] : UNABLE TO ASSESS [Cooperative] : cooperative [Euthymic] : euthymic [Full] : full [Clear] : clear [Linear/Goal Directed] : linear/goal directed [None Reported] : none reported [Average] : average [WNL] : within normal limits [de-identified] :  Pt has become more responsive and affect is full [de-identified] : PT ENDORSES ONGOING STABLE MOOD

## 2023-02-28 NOTE — PLAN
[Cognitive and/or Behavior Therapy] : Cognitive and/or Behavior Therapy  [FreeTextEntry2] : SEE DISCUSSION/SUMMARY\par 2/28/23\par NEW GOAL: I want to work on social anxiety and communication [Psychodynamic Therapy] : Psychodynamic Therapy  [Skills training (all types)] : Skills training (all types)  [de-identified] : PT agrees to work on new goal regarding improving social interaction and communication skills   (See TX Plan Revision)\par In session Bisi presents with full affect and good response. 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

## 2023-02-28 NOTE — PLAN
[Cognitive and/or Behavior Therapy] : Cognitive and/or Behavior Therapy  [FreeTextEntry2] : SEE DISCUSSION/SUMMARY\par 2/28/23\par NEW GOAL: I want to work on social anxiety and communication [Psychodynamic Therapy] : Psychodynamic Therapy  [Skills training (all types)] : Skills training (all types)  [de-identified] : PT agrees to work on new goal regarding improving social interaction and communication skills   (See TX Plan Revision)\par In session Bisi presents with full affect and good response. 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

## 2023-02-28 NOTE — DISCUSSION/SUMMARY
[Initial Plan] : Initial Plan [Able to set and pursue goals] : able to set and pursue goals [Adherent to treatment recommendations] : adherent to treatment recommendations [Attempting to realize their potential] : Attempting to realize their potential [Awareness of substance use issues] : awareness of substance use issues [Intelligent] : intelligent [Motivated to participate in treatment] : motivated to participate in treatment [Part of a supportive family] : part of a supportive family [Involved in cultural/spiritual/Mandaeism/community activities] : involved in cultural/spiritual/Mandaeism/community activities [Connected to healthcare] : connected to healthcare [Mental Health] : Mental Health [Interpersonal Relationships] : Interpersonal Relationships [Continued - Progress made] : Continued - Progress made: [Initial] : Initial [Attained - As evidenced by] : Attained - As evidenced by: [every ___ weeks] : every [unfilled] weeks [None - Reason others did not participate:] : None - Reason others did not participate:  [Yes] : Yes [Psychiatric Provider/Prescriber] : Psychiatric Provider/Prescriber [Therapist] : Therapist [FreeTextEntry1] : 11/8/2022 [FreeTextEntry2] : 11/8/2023 [FreeTextEntry3] : 9/28/2022 [FreeTextEntry7] : PT IS RESPONSIVE AND COMPLIANT WITH TREATMENT [FreeTextEntry8] : NONE SEEN AT THIS TIME [FreeTextEntry9] : POSITIVE CONNECTION TO SPIRITUALITY [de-identified] : PSYCHIATRIST [FreeTextEntry4] : I WANT TO MANAGE MY ANGER WHICH CAUSES ME TO BE IMPULSIVE AND GET INTO TROUBLE [de-identified] : Continued progress as of 2/28/2023 [de-identified] : USE GLORIA FOR MEDIITATION  AND EXERCISE [de-identified] : 11/8/2023 [de-identified] : USES YULI [de-identified] : I WANT TO LOOK FOR WORK [de-identified] : 11/08/2023 [de-identified] :  ... 2/28/23 Pt has been working FT for about 1 month [FreeTextEntry5] : \par NEW GOAL: 2/28/23   To be more social and improve communication with others thus reducing Social anxiety [de-identified] : STABLE MOOD AND RETURN TO WORK [de-identified] : NOT CLINICALLY INDICTATED

## 2023-02-28 NOTE — PHYSICAL EXAM
[Cooperative] : cooperative [Euthymic] : euthymic [Full] : full [Clear] : clear [Linear/Goal Directed] : linear/goal directed [None Reported] : none reported [Average] : average [WNL] : within normal limits [de-identified] :  Pt has become more responsive and affect is full [de-identified] : PT ENDORSES ONGOING STABLE MOOD

## 2023-02-28 NOTE — PLAN
[Psychodynamic Therapy] : Psychodynamic Therapy  [Skills training (all types)] : Skills training (all types)  [de-identified] : Bisi is more responsive and alert. She has now been working fT for approximately 1 month and is tolerating well. \par Bisi has been exercising regularly and taking care of her health. However she reports she has been lonely and needs to address social anxiety and improve communication skills.\par This is being addressed and Tx Plan has been updated with this goal\par Bisi would lime to join LGBTQ and make some friends at the support groups. This is encouraged\par  [FreeTextEntry1] : PLAN: Next weekly session scheduled for 3/7

## 2023-02-28 NOTE — PLAN
[Cognitive and/or Behavior Therapy] : Cognitive and/or Behavior Therapy  [FreeTextEntry2] : SEE DISCUSSION/SUMMARY\par 2/28/23\par NEW GOAL: I want to work on social anxiety and communication [Psychodynamic Therapy] : Psychodynamic Therapy  [Skills training (all types)] : Skills training (all types)  [de-identified] : PT agrees to work on new goal regarding improving social interaction and communication skills   (See TX Plan Revision)\par In session Bisi presents with full affect and good response. 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

## 2023-03-06 ENCOUNTER — OUTPATIENT (OUTPATIENT)
Dept: OUTPATIENT SERVICES | Facility: HOSPITAL | Age: 33
LOS: 1 days | End: 2023-03-06
Payer: COMMERCIAL

## 2023-03-06 ENCOUNTER — APPOINTMENT (OUTPATIENT)
Dept: PSYCHIATRY | Facility: CLINIC | Age: 33
End: 2023-03-06
Payer: COMMERCIAL

## 2023-03-06 DIAGNOSIS — F31.9 BIPOLAR DISORDER, UNSPECIFIED: ICD-10-CM

## 2023-03-06 DIAGNOSIS — F33.1 MAJOR DEPRESSIVE DISORDER, RECURRENT, MODERATE: ICD-10-CM

## 2023-03-06 PROCEDURE — 99214 OFFICE O/P EST MOD 30 MIN: CPT | Mod: 95

## 2023-03-06 RX ORDER — CHLORPROMAZINE HYDROCHLORIDE 50 MG/1
50 TABLET, SUGAR COATED ORAL
Qty: 30 | Refills: 0 | Status: DISCONTINUED | COMMUNITY
Start: 2022-09-28 | End: 2023-03-06

## 2023-03-06 NOTE — FAMILY HISTORY
[FreeTextEntry1] : Family composition: Lives alone in a studio apartment. Never  , no kids. \par Family history and background: Born and raised in [place]. Father passed away when she was a little girl, mother is alive. Older brother 47 y/o, and her sister is 49 y/o. \par Family relationship: Good with mother, close relationship with her siblings.  \par Pertinent Family Medical, MH and Substance Use History including Adult Child of Alcoholic and child of substance abuse status; history of cancer and heart disease:\par \par Father: passed away due to a car accident years ago. \par Mother 67 y/o: cholesterol, diabetes. \par \par \par

## 2023-03-06 NOTE — PHYSICAL EXAM
[Intermittent] : intermittent [Slowed] : slowed [Euthymic] : euthymic [Constricted] : constricted [Clear] : clear [Poverty of content] : poverty of content [WNL] : within normal limits [None] : none [None Reported] : none reported [Fund of knowledge] : fund of knowledge [Severe] : severe [FreeTextEntry1] : A bit more spontaneous [FreeTextEntry5] : less guarded [de-identified] : denies [de-identified] : poor
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
## 2023-03-06 NOTE — CURRENT PSYCHIATRIC SYMPTOMS
[Anhedonia] : anhedonia [Decreased Concentration] : decreased concentrating ability [Euphoria] : euphoria [Highly Irritable] : high irritability [Increased Activity] : increased activity [Distractibility] : distractibility [Talkativeness] : talkativeness [Grandeur] : feelings of grandeur [Buying Sprees] : buying sprees [Hypersexuality] : hypersexuality [Dec Need For Sleep] : decreased need for sleep [Excessive Worry] : excessive worry [Ruminations] : ruminations [Re-experiencing] : re-experiencing [Restlessness] : restlessness [Panic] : panic  [de-identified] : denies, but seems sad [de-identified] : pt denies [de-identified] : denies
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
## 2023-03-07 ENCOUNTER — OUTPATIENT (OUTPATIENT)
Dept: OUTPATIENT SERVICES | Facility: HOSPITAL | Age: 33
LOS: 1 days | End: 2023-03-07
Payer: COMMERCIAL

## 2023-03-07 ENCOUNTER — APPOINTMENT (OUTPATIENT)
Dept: PSYCHIATRY | Facility: CLINIC | Age: 33
End: 2023-03-07

## 2023-03-07 DIAGNOSIS — F31.9 BIPOLAR DISORDER, UNSPECIFIED: ICD-10-CM

## 2023-03-07 DIAGNOSIS — F33.1 MAJOR DEPRESSIVE DISORDER, RECURRENT, MODERATE: ICD-10-CM

## 2023-03-07 PROCEDURE — 90832 PSYTX W PT 30 MINUTES: CPT | Mod: 95

## 2023-03-07 NOTE — PHYSICAL EXAM
[Cooperative] : cooperative [Euthymic] : euthymic [Full] : full [Clear] : clear [Linear/Goal Directed] : linear/goal directed [None Reported] : none reported [Average] : average [WNL] : within normal limits [de-identified] :  Pt has become more responsive and affect is full [de-identified] : PT ENDORSES ONGOING STABLE MOOD

## 2023-03-07 NOTE — DISCUSSION/SUMMARY
[Initial Plan] : Initial Plan [Able to set and pursue goals] : able to set and pursue goals [Adherent to treatment recommendations] : adherent to treatment recommendations [Attempting to realize their potential] : Attempting to realize their potential [Awareness of substance use issues] : awareness of substance use issues [Intelligent] : intelligent [Motivated to participate in treatment] : motivated to participate in treatment [Part of a supportive family] : part of a supportive family [Involved in cultural/spiritual/Christian/community activities] : involved in cultural/spiritual/Christian/community activities [Connected to healthcare] : connected to healthcare [FreeTextEntry1] : 11/8/2022 [FreeTextEntry2] : 11/8/2023 [FreeTextEntry3] : 2/28/2022 [FreeTextEntry7] : PT IS RESPONSIVE AND COMPLIANT WITH TREATMENT [FreeTextEntry8] : NONE SEEN AT THIS TIME [FreeTextEntry9] : POSITIVE CONNECTION TO SPIRITUALITY [de-identified] : PSYCHIATRIST [Mental Health] : Mental Health [Interpersonal Relationships] : Interpersonal Relationships [Continued - Progress made] : Continued - Progress made: [Initial] : Initial [Attained - As evidenced by] : Attained - As evidenced by: [every ___ weeks] : every [unfilled] weeks [None - Reason others did not participate:] : None - Reason others did not participate:  [Yes] : Yes [Psychiatric Provider/Prescriber] : Psychiatric Provider/Prescriber [Therapist] : Therapist [FreeTextEntry4] : I WANT TO MANAGE MY ANGER WHICH CAUSES ME TO BE IMPULSIVE AND GET INTO TROUBLE [de-identified] : Continued progress as of 2/28/2023 [de-identified] : USE GLORIA FOR MEDIITATION  AND EXERCISE [de-identified] : 11/8/2023 [de-identified] : USES YULI [de-identified] : I WANT TO LOOK FOR WORK [de-identified] : 11/08/2023 [de-identified] :  ... 2/28/23 Pt has been working FT for about 1 month [FreeTextEntry5] : \par NEW GOAL: 2/28/23   To be more social and improve communication with others thus reducing Social anxiety [de-identified] : STABLE MOOD AND RETURN TO WORK [de-identified] : NOT CLINICALLY INDICTATED

## 2023-03-07 NOTE — PLAN
[FreeTextEntry2] : SEE DISCUSSION/SUMMARY\par 2/28/23\par NEW GOAL: I want to work on social anxiety and communication [Psychodynamic Therapy] : Psychodynamic Therapy  [Skills training (all types)] : Skills training (all types)  [de-identified] : Bisi continues to do well with FT job working out for her in a positive way.  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  WE continue to explore how to communicate in a manner that will help her receive the assistance she needs from the other LPNs on the floor and Bisi is responsive to the interventions\par Bisi continues to use CB coping skills as well as her spirituality to continue with meeting her challenges\par She continues to have goal of improving her health through diet and nutrition\par She is alert, and interactive and support is provided [FreeTextEntry1] : PLAN: next session in 1 week

## 2023-03-07 NOTE — REASON FOR VISIT
[Patient preference] : as per patient preference [Access issues (e.g., transportation, impaired mobility, etc.)] : due to patient's access issues [Continuing, patient not seen in-person within last 12 months (provide details below)] : Telehealth services are continuing, patient not seen in-person within last 12 months.  [Telehealth (audio & video) - Individual/Group] : This visit was provided via telehealth using real-time 2-way audio visual technology. [Other Location: e.g. Home (Enter Location, City,State)___] : The provider was located at [unfilled]. [Home] : The patient, [unfilled], was located at home, [unfilled], at the time of the visit. [FreeTextEntry4] : 10:15 am [FreeTextEntry5] : 10:45 am [Patient] : Patient [FreeTextEntry1] : Psychotherapy follow up

## 2023-03-14 ENCOUNTER — APPOINTMENT (OUTPATIENT)
Dept: PSYCHIATRY | Facility: CLINIC | Age: 33
End: 2023-03-14

## 2023-03-14 ENCOUNTER — OUTPATIENT (OUTPATIENT)
Dept: OUTPATIENT SERVICES | Facility: HOSPITAL | Age: 33
LOS: 1 days | End: 2023-03-14
Payer: COMMERCIAL

## 2023-03-14 DIAGNOSIS — F31.9 BIPOLAR DISORDER, UNSPECIFIED: ICD-10-CM

## 2023-03-14 DIAGNOSIS — F33.1 MAJOR DEPRESSIVE DISORDER, RECURRENT, MODERATE: ICD-10-CM

## 2023-03-14 PROCEDURE — 90832 PSYTX W PT 30 MINUTES: CPT | Mod: 95

## 2023-03-14 NOTE — DISCUSSION/SUMMARY
[Initial Plan] : Initial Plan [Able to set and pursue goals] : able to set and pursue goals [Adherent to treatment recommendations] : adherent to treatment recommendations [Attempting to realize their potential] : Attempting to realize their potential [Awareness of substance use issues] : awareness of substance use issues [Intelligent] : intelligent [Motivated to participate in treatment] : motivated to participate in treatment [Part of a supportive family] : part of a supportive family [Involved in cultural/spiritual/Restorationism/community activities] : involved in cultural/spiritual/Restorationism/community activities [Connected to healthcare] : connected to healthcare [FreeTextEntry1] : 11/8/2022 [FreeTextEntry2] : 11/8/2023 [FreeTextEntry3] : 2/28/2022 [FreeTextEntry7] : PT IS RESPONSIVE AND COMPLIANT WITH TREATMENT [FreeTextEntry8] : NONE SEEN AT THIS TIME [FreeTextEntry9] : POSITIVE CONNECTION TO SPIRITUALITY [de-identified] : PSYCHIATRIST [Mental Health] : Mental Health [Interpersonal Relationships] : Interpersonal Relationships [Continued - Progress made] : Continued - Progress made: [Initial] : Initial [Attained - As evidenced by] : Attained - As evidenced by: [every ___ weeks] : every [unfilled] weeks [None - Reason others did not participate:] : None - Reason others did not participate:  [Yes] : Yes [Psychiatric Provider/Prescriber] : Psychiatric Provider/Prescriber [Therapist] : Therapist [FreeTextEntry4] : I WANT TO MANAGE MY ANGER WHICH CAUSES ME TO BE IMPULSIVE AND GET INTO TROUBLE [de-identified] : Continued progress as of 2/28/2023 [de-identified] : USE GLORIA FOR MEDIITATION  AND EXERCISE [de-identified] : 11/8/2023 [de-identified] : USES [facility] [de-identified] : I WANT TO LOOK FOR WORK [de-identified] : 11/08/2023 [de-identified] :  ... 2/28/23 Pt has been working FT for about 1 month [FreeTextEntry5] : \par NEW GOAL: 2/28/23   To be more social and improve communication with others thus reducing Social anxiety [de-identified] : STABLE MOOD AND RETURN TO WORK [de-identified] : NOT CLINICALLY INDICTATED

## 2023-03-14 NOTE — REASON FOR VISIT
[Patient preference] : as per patient preference [Continuing, patient not seen in-person within last 12 months (provide details below)] : Telehealth services are continuing, patient not seen in-person within last 12 months.  [Telehealth (audio & video) - Individual/Group] : This visit was provided via telehealth using real-time 2-way audio visual technology. [Other Location: e.g. Home (Enter Location, City,State)___] : The provider was located at [unfilled]. [Home] : The patient, [unfilled], was located at home, [unfilled], at the time of the visit. [FreeTextEntry4] : 10:15 am [FreeTextEntry5] : 10:45 am [Patient] : Patient [FreeTextEntry1] : Psychotherapy follow up

## 2023-03-14 NOTE — PLAN
[FreeTextEntry2] : SEE DISCUSSION/SUMMARY\par 2/28/23\par NEW GOAL: I want to work on social anxiety and communication [Psychodynamic Therapy] : Psychodynamic Therapy  [Skills training (all types)] : Skills training (all types)  [de-identified] : Bisi endorses ongoing stable mood and good functioning.She has a Court Hearing this Thursday and was informed that a positive report ws provided via phone to  Doyle yesterday\par Bisi continues to feel she must have a vision for he r future and is not sure where she "should be going."\par We emphasized how far she has already come an d accentuated the present where she plans to continue on the sane path. She is encouraged to consider journalling where some ways may become clearer when she sees them in script.   \par Her goal of improving connections with others continues to be discussed with her being able to accept that some family members may not be aware of her desire to connect unless she shares this with them\par Bisi is alert, oriented and interactive with good response to session [FreeTextEntry1] : Patient continues with weekly therapy sessions

## 2023-03-14 NOTE — PHYSICAL EXAM
[Cooperative] : cooperative [Euthymic] : euthymic [Full] : full [Clear] : clear [Linear/Goal Directed] : linear/goal directed [None Reported] : none reported [Average] : average [WNL] : within normal limits [de-identified] :  Pt has become more responsive and affect is full [de-identified] : PT ENDORSES ONGOING STABLE MOOD

## 2023-03-21 ENCOUNTER — OUTPATIENT (OUTPATIENT)
Dept: OUTPATIENT SERVICES | Facility: HOSPITAL | Age: 33
LOS: 1 days | End: 2023-03-21
Payer: COMMERCIAL

## 2023-03-21 ENCOUNTER — APPOINTMENT (OUTPATIENT)
Dept: PSYCHIATRY | Facility: CLINIC | Age: 33
End: 2023-03-21

## 2023-03-21 DIAGNOSIS — F43.10 POST-TRAUMATIC STRESS DISORDER, UNSPECIFIED: ICD-10-CM

## 2023-03-21 DIAGNOSIS — F51.05 INSOMNIA DUE TO OTHER MENTAL DISORDER: ICD-10-CM

## 2023-03-21 DIAGNOSIS — F41.1 GENERALIZED ANXIETY DISORDER: ICD-10-CM

## 2023-03-21 DIAGNOSIS — F31.9 BIPOLAR DISORDER, UNSPECIFIED: ICD-10-CM

## 2023-03-21 DIAGNOSIS — F60.9 PERSONALITY DISORDER, UNSPECIFIED: ICD-10-CM

## 2023-03-21 DIAGNOSIS — F33.1 MAJOR DEPRESSIVE DISORDER, RECURRENT, MODERATE: ICD-10-CM

## 2023-03-21 PROCEDURE — 90832 PSYTX W PT 30 MINUTES: CPT | Mod: 95

## 2023-03-21 NOTE — PHYSICAL EXAM
[Cooperative] : cooperative [Euthymic] : euthymic [Full] : full [Clear] : clear [Linear/Goal Directed] : linear/goal directed [None Reported] : none reported [Average] : average [WNL] : within normal limits [de-identified] :  Pt has become more responsive and affect is full [de-identified] : PT ENDORSES ONGOING STABLE MOOD

## 2023-03-21 NOTE — REASON FOR VISIT
[Continuing, patient not seen in-person within last 12 months (provide details below)] : Telehealth services are continuing, patient not seen in-person within last 12 months.  [Telehealth (audio & video) - Individual/Group] : This visit was provided via telehealth using real-time 2-way audio visual technology. [Other Location: e.g. Home (Enter Location, City,State)___] : The provider was located at [unfilled]. [Home] : The patient, [unfilled], was located at home, [unfilled], at the time of the visit. [FreeTextEntry4] : 10:15 am [FreeTextEntry5] : 10:45 am [Patient] : Patient [FreeTextEntry1] : Psychotherapy follow up

## 2023-03-21 NOTE — DISCUSSION/SUMMARY
[Initial Plan] : Initial Plan [Able to set and pursue goals] : able to set and pursue goals [Adherent to treatment recommendations] : adherent to treatment recommendations [Attempting to realize their potential] : Attempting to realize their potential [Awareness of substance use issues] : awareness of substance use issues [Intelligent] : intelligent [Motivated to participate in treatment] : motivated to participate in treatment [Part of a supportive family] : part of a supportive family [Involved in cultural/spiritual/Samaritan/community activities] : involved in cultural/spiritual/Samaritan/community activities [Connected to healthcare] : connected to healthcare [FreeTextEntry1] : 11/8/2022 [FreeTextEntry2] : 11/8/2023 [FreeTextEntry3] : 2/28/2022 [FreeTextEntry7] : PT IS RESPONSIVE AND COMPLIANT WITH TREATMENT [FreeTextEntry8] : NONE SEEN AT THIS TIME [FreeTextEntry9] : POSITIVE CONNECTION TO SPIRITUALITY [de-identified] : PSYCHIATRIST [Mental Health] : Mental Health [Interpersonal Relationships] : Interpersonal Relationships [Continued - Progress made] : Continued - Progress made: [Initial] : Initial [Attained - As evidenced by] : Attained - As evidenced by: [every ___ weeks] : every [unfilled] weeks [None - Reason others did not participate:] : None - Reason others did not participate:  [Yes] : Yes [Psychiatric Provider/Prescriber] : Psychiatric Provider/Prescriber [Therapist] : Therapist [FreeTextEntry4] : I WANT TO MANAGE MY ANGER WHICH CAUSES ME TO BE IMPULSIVE AND GET INTO TROUBLE [de-identified] : Continued progress as of 2/28/2023 [de-identified] : USE GLORIA FOR MEDIITATION  AND EXERCISE [de-identified] : 11/8/2023 [de-identified] : USES [facility] [de-identified] : I WANT TO LOOK FOR WORK [de-identified] : 11/08/2023 [de-identified] :  ... 2/28/23 Pt has been working FT for about 1 month [FreeTextEntry5] : \par NEW GOAL: 2/28/23   To be more social and improve communication with others thus reducing Social anxiety [de-identified] : STABLE MOOD AND RETURN TO WORK [de-identified] : NOT CLINICALLY INDICTATED

## 2023-03-21 NOTE — PLAN
[FreeTextEntry2] : SEE DISCUSSION/SUMMARY\par 2/28/23\par NEW GOAL: I want to work on social anxiety and communication [Psychodynamic Therapy] : Psychodynamic Therapy  [Skills training (all types)] : Skills training (all types)  [Supportive Therapy] : Supportive Therapy [de-identified] : Bisi reports a positive court hearing and she does not have to appear next month due to he r ongoing progress\par We continue to explore and process coping skills which Pt uses daily such as meditation and positive affirmations.\par  Bisi plans to begin her objective of socialization by joining a former group where she had felt comfortable\par She is provided with support and encouragement [FreeTextEntry1] : PLAN: Weekly therapy continues

## 2023-03-28 ENCOUNTER — OUTPATIENT (OUTPATIENT)
Dept: OUTPATIENT SERVICES | Facility: HOSPITAL | Age: 33
LOS: 1 days | End: 2023-03-28
Payer: COMMERCIAL

## 2023-03-28 ENCOUNTER — APPOINTMENT (OUTPATIENT)
Dept: PSYCHIATRY | Facility: CLINIC | Age: 33
End: 2023-03-28

## 2023-03-28 DIAGNOSIS — F31.9 BIPOLAR DISORDER, UNSPECIFIED: ICD-10-CM

## 2023-03-28 DIAGNOSIS — F33.1 MAJOR DEPRESSIVE DISORDER, RECURRENT, MODERATE: ICD-10-CM

## 2023-03-28 PROCEDURE — 90832 PSYTX W PT 30 MINUTES: CPT | Mod: 95

## 2023-03-28 NOTE — PLAN
[FreeTextEntry2] : SEE DISCUSSION/SUMMARY\par 2/28/23\par NEW GOAL: I want to work on social anxiety and communication [Psychodynamic Therapy] : Psychodynamic Therapy  [Skills training (all types)] : Skills training (all types)  [de-identified] : Bisi seems somewhat dissatisfied with herself stating she needs to be "more consistent"\par She expresses the fact that she wants to be consistent with exercise and diet/self care\par She also brings to session issues involving communication with others as well as boundary setting  Specifically she identifies these challenges both at work and at home.\par We continue to discuss how her need to please others sometimes causes her to neglect her own needs (sister)\par Bisi is responsive to interventions and alert and focused in session\par She is provided with support and sessions continue weekly  [FreeTextEntry1] : PLAN: Next session scheduled for 4/4

## 2023-03-28 NOTE — DISCUSSION/SUMMARY
[Initial Plan] : Initial Plan [Able to set and pursue goals] : able to set and pursue goals [Adherent to treatment recommendations] : adherent to treatment recommendations [Attempting to realize their potential] : Attempting to realize their potential [Awareness of substance use issues] : awareness of substance use issues [Intelligent] : intelligent [Motivated to participate in treatment] : motivated to participate in treatment [Part of a supportive family] : part of a supportive family [Involved in cultural/spiritual/Rastafarian/community activities] : involved in cultural/spiritual/Rastafarian/community activities [Connected to healthcare] : connected to healthcare [FreeTextEntry1] : 11/8/2022 [FreeTextEntry2] : 11/8/2023 [FreeTextEntry3] : 2/28/2022 [FreeTextEntry7] : PT IS RESPONSIVE AND COMPLIANT WITH TREATMENT [FreeTextEntry8] : NONE SEEN AT THIS TIME [FreeTextEntry9] : POSITIVE CONNECTION TO SPIRITUALITY [de-identified] : PSYCHIATRIST [Mental Health] : Mental Health [Interpersonal Relationships] : Interpersonal Relationships [Continued - Progress made] : Continued - Progress made: [Initial] : Initial [Attained - As evidenced by] : Attained - As evidenced by: [every ___ weeks] : every [unfilled] weeks [None - Reason others did not participate:] : None - Reason others did not participate:  [Yes] : Yes [Psychiatric Provider/Prescriber] : Psychiatric Provider/Prescriber [Therapist] : Therapist [FreeTextEntry4] : I WANT TO MANAGE MY ANGER WHICH CAUSES ME TO BE IMPULSIVE AND GET INTO TROUBLE [de-identified] : Continued progress as of 2/28/2023 [de-identified] : USE GLORIA FOR MEDIITATION  AND EXERCISE [de-identified] : 11/8/2023 [de-identified] : USES [facility] [de-identified] : I WANT TO LOOK FOR WORK [de-identified] : 11/08/2023 [de-identified] :  ... 2/28/23 Pt has been working FT for about 1 month [FreeTextEntry5] : \par NEW GOAL: 2/28/23   To be more social and improve communication with others thus reducing Social anxiety [de-identified] : STABLE MOOD AND RETURN TO WORK [de-identified] : NOT CLINICALLY INDICTATED

## 2023-03-28 NOTE — PHYSICAL EXAM
[Cooperative] : cooperative [Euthymic] : euthymic [Full] : full [Clear] : clear [Linear/Goal Directed] : linear/goal directed [None Reported] : none reported [Average] : average [WNL] : within normal limits [de-identified] :  Pt has become more responsive and affect is full [de-identified] : PT ENDORSES ONGOING STABLE MOOD

## 2023-04-04 ENCOUNTER — APPOINTMENT (OUTPATIENT)
Dept: PSYCHIATRY | Facility: CLINIC | Age: 33
End: 2023-04-04

## 2023-04-04 ENCOUNTER — OUTPATIENT (OUTPATIENT)
Dept: OUTPATIENT SERVICES | Facility: HOSPITAL | Age: 33
LOS: 1 days | End: 2023-04-04
Payer: COMMERCIAL

## 2023-04-04 DIAGNOSIS — F33.1 MAJOR DEPRESSIVE DISORDER, RECURRENT, MODERATE: ICD-10-CM

## 2023-04-04 DIAGNOSIS — F31.9 BIPOLAR DISORDER, UNSPECIFIED: ICD-10-CM

## 2023-04-04 PROCEDURE — 90832 PSYTX W PT 30 MINUTES: CPT | Mod: 95

## 2023-04-04 NOTE — REASON FOR VISIT
[Patient preference] : as per patient preference [Telehealth (audio & video) - Individual/Group] : This visit was provided via telehealth using real-time 2-way audio visual technology. [Other Location: e.g. Home (Enter Location, City,State)___] : The provider was located at [unfilled]. [Home] : The patient, [unfilled], was located at home, [unfilled], at the time of the visit. [Verbal consent obtained from patient/other participant(s)] : Verbal consent for telehealth/telephonic services obtained from patient/other participant(s) [FreeTextEntry4] : 10:25 am [FreeTextEntry5] : 10:45 am [FreeTextEntry1] : Psychotherapy follow up

## 2023-04-04 NOTE — PLAN
[Supportive Therapy] : Supportive Therapy [FreeTextEntry2] : SEE DISCUSSION/SUMMARY\par 2/28/23\par NEW GOAL: I want to work on social anxiety and communication\par 4/4/23: Pt doing well and is now reduced to  biweekly therapy  [de-identified] : Bisi is doing well and continues to report stability and medication compliance. She has reported that she is continuing to work on communication skills and has discussed her feelings regarding a party which will be given by her sister which she would rather not attend.  Her sister had listened but tried to convince her to attend and she decided to comply. She is commended for her ability to appropriately express  her feelings \par We explored the possibility of reducing session to biweekly and Bisi is agreeable to this change\par \par \par \par \par \par \par \par \par \par \par \par \par \par \par \par possibility of reducing frequency of sessions and Bisi is agreeable to change to biweekly therapy  [FreeTextEntry1] : PLAN: Next session scheduled for 4/20

## 2023-04-04 NOTE — DISCUSSION/SUMMARY
[Initial Plan] : Initial Plan [Able to set and pursue goals] : able to set and pursue goals [Adherent to treatment recommendations] : adherent to treatment recommendations [Attempting to realize their potential] : Attempting to realize their potential [Awareness of substance use issues] : awareness of substance use issues [Intelligent] : intelligent [Motivated to participate in treatment] : motivated to participate in treatment [Part of a supportive family] : part of a supportive family [Involved in cultural/spiritual/Worship/community activities] : involved in cultural/spiritual/Worship/community activities [Connected to healthcare] : connected to healthcare [Mental Health] : Mental Health [Interpersonal Relationships] : Interpersonal Relationships [Continued - Progress made] : Continued - Progress made: [Initial] : Initial [Attained - As evidenced by] : Attained - As evidenced by: [every ___ weeks] : every [unfilled] weeks [None - Reason others did not participate:] : None - Reason others did not participate:  [Yes] : Yes [Psychiatric Provider/Prescriber] : Psychiatric Provider/Prescriber [Therapist] : Therapist [FreeTextEntry1] : 11/8/2022 [FreeTextEntry2] : 11/8/2023 [FreeTextEntry3] : 2/28/2022 [FreeTextEntry7] : PT IS RESPONSIVE AND COMPLIANT WITH TREATMENT [FreeTextEntry8] : NONE SEEN AT THIS TIME [FreeTextEntry9] : POSITIVE CONNECTION TO SPIRITUALITY [de-identified] : PSYCHIATRIST [FreeTextEntry4] : I WANT TO MANAGE MY ANGER WHICH CAUSES ME TO BE IMPULSIVE AND GET INTO TROUBLE [de-identified] : Continued progress as of 2/28/2023 [de-identified] : USE GLORIA FOR MEDIITATION  AND EXERCISE [de-identified] : 11/8/2023 [de-identified] : USES [facility] [de-identified] : I WANT TO LOOK FOR WORK [de-identified] : 11/08/2023 [de-identified] :  ... 2/28/23 Pt has been working FT for about 1 month [FreeTextEntry5] : \par NEW GOAL: 2/28/23   To be more social and improve communication with others thus reducing Social anxiety [de-identified] : STABLE MOOD AND RETURN TO WORK [de-identified] : NOT CLINICALLY INDICTATED

## 2023-04-04 NOTE — PHYSICAL EXAM
[Cooperative] : cooperative [Euthymic] : euthymic [Full] : full [Clear] : clear [Linear/Goal Directed] : linear/goal directed [None Reported] : none reported [Average] : average [WNL] : within normal limits [de-identified] :  Pt has become more responsive and affect is full [de-identified] : PT ENDORSES ONGOING STABLE MOOD

## 2023-04-06 ENCOUNTER — APPOINTMENT (OUTPATIENT)
Dept: PSYCHIATRY | Facility: CLINIC | Age: 33
End: 2023-04-06
Payer: COMMERCIAL

## 2023-04-06 ENCOUNTER — OUTPATIENT (OUTPATIENT)
Dept: OUTPATIENT SERVICES | Facility: HOSPITAL | Age: 33
LOS: 1 days | End: 2023-04-06
Payer: COMMERCIAL

## 2023-04-06 DIAGNOSIS — F31.9 BIPOLAR DISORDER, UNSPECIFIED: ICD-10-CM

## 2023-04-06 DIAGNOSIS — F33.1 MAJOR DEPRESSIVE DISORDER, RECURRENT, MODERATE: ICD-10-CM

## 2023-04-06 PROCEDURE — 99214 OFFICE O/P EST MOD 30 MIN: CPT | Mod: 95

## 2023-04-06 NOTE — CURRENT PSYCHIATRIC SYMPTOMS
[Anhedonia] : anhedonia [Decreased Concentration] : decreased concentrating ability [Euphoria] : euphoria [Highly Irritable] : high irritability [Increased Activity] : increased activity [Distractibility] : distractibility [Talkativeness] : talkativeness [Grandeur] : feelings of grandeur [Buying Sprees] : buying sprees [Hypersexuality] : hypersexuality [Dec Need For Sleep] : decreased need for sleep [Excessive Worry] : excessive worry [Ruminations] : ruminations [Re-experiencing] : re-experiencing [Restlessness] : restlessness [Panic] : panic  [de-identified] : denies, but seems sad [de-identified] : pt denies [de-identified] : denies
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
## 2023-04-06 NOTE — FAMILY HISTORY
[FreeTextEntry1] : Family composition: Lives alone in a studio apartment. Never  , no kids. \par Family history and background: Born and raised in [place]. Father passed away when she was a little girl, mother is alive. Older brother 45 y/o, and her sister is 51 y/o. \par Family relationship: Good with mother, close relationship with her siblings.  \par Pertinent Family Medical, MH and Substance Use History including Adult Child of Alcoholic and child of substance abuse status; history of cancer and heart disease:\par \par Father: passed away due to a car accident years ago. \par Mother 65 y/o: cholesterol, diabetes. \par \par \par

## 2023-04-06 NOTE — PHYSICAL EXAM
[Intermittent] : intermittent [Slowed] : slowed [Euthymic] : euthymic [Constricted] : constricted [Clear] : clear [Poverty of content] : poverty of content [None] : none [None Reported] : none reported [Fund of knowledge] : fund of knowledge [Average] : average [WNL] : within normal limits [Moderate] : moderate [FreeTextEntry5] : less guarded [de-identified] : denies [de-identified] : poor
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
## 2023-04-18 ENCOUNTER — OUTPATIENT (OUTPATIENT)
Dept: OUTPATIENT SERVICES | Facility: HOSPITAL | Age: 33
LOS: 1 days | End: 2023-04-18
Payer: COMMERCIAL

## 2023-04-18 ENCOUNTER — APPOINTMENT (OUTPATIENT)
Dept: PSYCHIATRY | Facility: CLINIC | Age: 33
End: 2023-04-18

## 2023-04-18 DIAGNOSIS — F33.1 MAJOR DEPRESSIVE DISORDER, RECURRENT, MODERATE: ICD-10-CM

## 2023-04-18 DIAGNOSIS — F31.9 BIPOLAR DISORDER, UNSPECIFIED: ICD-10-CM

## 2023-04-18 PROCEDURE — 90832 PSYTX W PT 30 MINUTES: CPT | Mod: 95

## 2023-04-18 NOTE — PHYSICAL EXAM
[Cooperative] : cooperative [Euthymic] : euthymic [Full] : full [Clear] : clear [Linear/Goal Directed] : linear/goal directed [None Reported] : none reported [Average] : average [WNL] : within normal limits [de-identified] :  Pt has become more responsive and affect is full [de-identified] : PT ENDORSES ONGOING STABLE MOOD

## 2023-04-18 NOTE — PLAN
[FreeTextEntry2] : SEE DISCUSSION/SUMMARY\par 2/28/23\par NEW GOAL: I want to work on social anxiety and communication\par 4/4/23: Pt doing well and is now reduced to  biweekly therapy  [Psychodynamic Therapy] : Psychodynamic Therapy  [Skills training (all types)] : Skills training (all types)  [de-identified] : Bisi endorses a positive feeling that she is making progress. She has attended the Party which she had resisted in past but acknowledged having a good time\par Bisi continues to work on her fears of pushing beyond her comfort zone and has joined a gym and attended several times since she first joined. \par WE continue to explore other avenues of socialization and she mentioned a group she used to meet up with and plans to contact them again [FreeTextEntry1] : PLAN: Next session scheduled for 2 weeks

## 2023-04-18 NOTE — DISCUSSION/SUMMARY
[Initial Plan] : Initial Plan [Able to set and pursue goals] : able to set and pursue goals [Adherent to treatment recommendations] : adherent to treatment recommendations [Attempting to realize their potential] : Attempting to realize their potential [Awareness of substance use issues] : awareness of substance use issues [Intelligent] : intelligent [Motivated to participate in treatment] : motivated to participate in treatment [Part of a supportive family] : part of a supportive family [Involved in cultural/spiritual/Samaritan/community activities] : involved in cultural/spiritual/Samaritan/community activities [Connected to healthcare] : connected to healthcare [FreeTextEntry1] : 11/8/2022 [FreeTextEntry2] : 11/8/2023 [FreeTextEntry3] : 2/28/2022 [FreeTextEntry7] : PT IS RESPONSIVE AND COMPLIANT WITH TREATMENT [FreeTextEntry8] : NONE SEEN AT THIS TIME [FreeTextEntry9] : POSITIVE CONNECTION TO SPIRITUALITY [de-identified] : PSYCHIATRIST [Mental Health] : Mental Health [Interpersonal Relationships] : Interpersonal Relationships [Continued - Progress made] : Continued - Progress made: [Initial] : Initial [Attained - As evidenced by] : Attained - As evidenced by: [every ___ weeks] : every [unfilled] weeks [None - Reason others did not participate:] : None - Reason others did not participate:  [Yes] : Yes [Psychiatric Provider/Prescriber] : Psychiatric Provider/Prescriber [Therapist] : Therapist [FreeTextEntry4] : I WANT TO MANAGE MY ANGER WHICH CAUSES ME TO BE IMPULSIVE AND GET INTO TROUBLE [de-identified] : Continued progress as of 2/28/2023 [de-identified] : USE GLORIA FOR MEDIITATION  AND EXERCISE [de-identified] : 11/8/2023 [de-identified] : USES [facility] [de-identified] : I WANT TO LOOK FOR WORK [de-identified] : 11/08/2023 [de-identified] :  ... 2/28/23 Pt has been working FT for about 1 month [FreeTextEntry5] : \par NEW GOAL: 2/28/23   To be more social and improve communication with others thus reducing Social anxiety [de-identified] : STABLE MOOD AND RETURN TO WORK [de-identified] : NOT CLINICALLY INDICTATED

## 2023-05-02 ENCOUNTER — APPOINTMENT (OUTPATIENT)
Dept: PSYCHIATRY | Facility: CLINIC | Age: 33
End: 2023-05-02

## 2023-05-02 ENCOUNTER — OUTPATIENT (OUTPATIENT)
Dept: OUTPATIENT SERVICES | Facility: HOSPITAL | Age: 33
LOS: 1 days | End: 2023-05-02
Payer: COMMERCIAL

## 2023-05-02 DIAGNOSIS — F31.9 BIPOLAR DISORDER, UNSPECIFIED: ICD-10-CM

## 2023-05-02 DIAGNOSIS — F33.1 MAJOR DEPRESSIVE DISORDER, RECURRENT, MODERATE: ICD-10-CM

## 2023-05-02 PROCEDURE — 90832 PSYTX W PT 30 MINUTES: CPT | Mod: 95

## 2023-05-02 NOTE — PLAN
[FreeTextEntry2] : SEE DISCUSSION/SUMMARY\par 4/4/23: Pt doing well and is now reduced to  biweekly therapy \par 5/2/2023; Pt advised that due to clinic policy, she will be transferred to another therapist in a few weeks. \par Bisi will terminate with this therapist and mood observed during transition [Supportive Therapy] : Supportive Therapy [de-identified] : Bisi reports no new concerns but is unhappy with the proposed transfer to new therapist. As she is court mandated, she will need to continue in therapy. WE discussed transition process as well as time frame for termination with this therapist\saumya Bisi is also referred back to her Team headed by Psychiatrist, Dr Carl and she will discuss this with Dr Carl\saumya Bisi is provided with support and validation and she requests a session next week to further discuss this [FreeTextEntry1] : PLAN: Next session scheduled for 5/9

## 2023-05-02 NOTE — REASON FOR VISIT
[Patient preference] : as per patient preference [Continuing, patient not seen in-person within last 12 months (provide details below)] : Telehealth services are continuing, patient not seen in-person within last 12 months.  [Telehealth (audio & video) - Individual/Group] : This visit was provided via telehealth using real-time 2-way audio visual technology. [Other Location: e.g. Home (Enter Location, City,State)___] : The provider was located at [unfilled]. [Home] : The patient, [unfilled], was located at home, [unfilled], at the time of the visit. [Verbal consent obtained from patient/other participant(s)] : Verbal consent for telehealth/telephonic services obtained from patient/other participant(s) [FreeTextEntry4] : 10:30 am [FreeTextEntry5] : 11:00am [Patient] : Patient [FreeTextEntry1] : Psychotherapy follow up

## 2023-05-02 NOTE — PHYSICAL EXAM
[Cooperative] : cooperative [Euthymic] : euthymic [Full] : full [Clear] : clear [Linear/Goal Directed] : linear/goal directed [None Reported] : none reported [Average] : average [WNL] : within normal limits [de-identified] :  Pt has become more responsive and affect is full [de-identified] : PT ENDORSES ONGOING STABLE MOOD

## 2023-05-04 ENCOUNTER — OUTPATIENT (OUTPATIENT)
Dept: OUTPATIENT SERVICES | Facility: HOSPITAL | Age: 33
LOS: 1 days | End: 2023-05-04
Payer: COMMERCIAL

## 2023-05-04 ENCOUNTER — APPOINTMENT (OUTPATIENT)
Dept: PSYCHIATRY | Facility: CLINIC | Age: 33
End: 2023-05-04
Payer: COMMERCIAL

## 2023-05-04 DIAGNOSIS — F33.1 MAJOR DEPRESSIVE DISORDER, RECURRENT, MODERATE: ICD-10-CM

## 2023-05-04 DIAGNOSIS — F31.9 BIPOLAR DISORDER, UNSPECIFIED: ICD-10-CM

## 2023-05-04 DIAGNOSIS — F60.9 PERSONALITY DISORDER, UNSPECIFIED: ICD-10-CM

## 2023-05-04 PROCEDURE — 99214 OFFICE O/P EST MOD 30 MIN: CPT | Mod: 95
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
## 2023-05-04 NOTE — PHYSICAL EXAM
[Average] : average [Intermittent] : intermittent [Slowed] : slowed [Euthymic] : euthymic [Constricted] : constricted [Clear] : clear [Poverty of content] : poverty of content [WNL] : within normal limits [None] : none [None Reported] : none reported [Fund of knowledge] : fund of knowledge [Moderate] : moderate [FreeTextEntry5] : less guarded [de-identified] : denies [de-identified] : poor
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
## 2023-05-04 NOTE — FAMILY HISTORY
[FreeTextEntry1] : Family composition: Lives alone in a studio apartment. Never  , no kids. \par Family history and background: Born and raised in [place]. Father passed away when she was a little girl, mother is alive. Older brother 47 y/o, and her sister is 51 y/o. \par Family relationship: Good with mother, close relationship with her siblings.  \par Pertinent Family Medical, MH and Substance Use History including Adult Child of Alcoholic and child of substance abuse status; history of cancer and heart disease:\par \par Father: passed away due to a car accident years ago. \par Mother 65 y/o: cholesterol, diabetes. \par \par \par

## 2023-05-05 DIAGNOSIS — F31.9 BIPOLAR DISORDER, UNSPECIFIED: ICD-10-CM

## 2023-05-09 ENCOUNTER — APPOINTMENT (OUTPATIENT)
Dept: PSYCHIATRY | Facility: CLINIC | Age: 33
End: 2023-05-09

## 2023-05-09 ENCOUNTER — OUTPATIENT (OUTPATIENT)
Dept: OUTPATIENT SERVICES | Facility: HOSPITAL | Age: 33
LOS: 1 days | End: 2023-05-09
Payer: COMMERCIAL

## 2023-05-09 DIAGNOSIS — F33.1 MAJOR DEPRESSIVE DISORDER, RECURRENT, MODERATE: ICD-10-CM

## 2023-05-09 DIAGNOSIS — F31.9 BIPOLAR DISORDER, UNSPECIFIED: ICD-10-CM

## 2023-05-09 PROCEDURE — 90832 PSYTX W PT 30 MINUTES: CPT | Mod: 95
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
## 2023-05-09 NOTE — PHYSICAL EXAM
[Cooperative] : cooperative [Euthymic] : euthymic [Full] : full [Clear] : clear [Linear/Goal Directed] : linear/goal directed [None Reported] : none reported [Average] : average [WNL] : within normal limits [de-identified] :  Pt has become more responsive and affect is full [de-identified] : PT ENDORSES ONGOING STABLE MOOD

## 2023-05-09 NOTE — PLAN
[FreeTextEntry2] : SEE DISCUSSION/SUMMARY\par 4/4/23: Pt doing well and is now reduced to  biweekly therapy \par 5/2/2023; Pt advised that due to clinic policy, she will be transferred to another therapist in a few weeks. \par Bisi will terminate with this therapist and mood observed during transition [Cognitive and/or Behavior Therapy] : Cognitive and/or Behavior Therapy  [Supportive Therapy] : Supportive Therapy [de-identified] : Bisi maintains stability with euthymic mood and good functioning\par Bisi continues to be concerned about  as well as avoid social interactions\par WE reviewed thought refocusing techniques to which Bisi is responsive\par Bisi is concerned about transfer to new therapist but she is reminded of how far she has come and another therapist may provided her with other skills not yet learned\par Bisi is provided with support and process towards further progress is discussed\par Bisi will have another session in one week

## 2023-05-16 ENCOUNTER — OUTPATIENT (OUTPATIENT)
Dept: OUTPATIENT SERVICES | Facility: HOSPITAL | Age: 33
LOS: 1 days | End: 2023-05-16
Payer: COMMERCIAL

## 2023-05-16 ENCOUNTER — APPOINTMENT (OUTPATIENT)
Dept: PSYCHIATRY | Facility: CLINIC | Age: 33
End: 2023-05-16

## 2023-05-16 DIAGNOSIS — F31.9 BIPOLAR DISORDER, UNSPECIFIED: ICD-10-CM

## 2023-05-16 DIAGNOSIS — F33.1 MAJOR DEPRESSIVE DISORDER, RECURRENT, MODERATE: ICD-10-CM

## 2023-05-16 PROCEDURE — 90832 PSYTX W PT 30 MINUTES: CPT | Mod: 95

## 2023-05-16 NOTE — PHYSICAL EXAM
[Cooperative] : cooperative [Euthymic] : euthymic [Full] : full [Clear] : clear [Linear/Goal Directed] : linear/goal directed [None Reported] : none reported [Average] : average [WNL] : within normal limits [de-identified] :  Pt has become more responsive and affect is full [de-identified] : PT ENDORSES ONGOING STABLE MOOD\par Some increase in anxiety due to upcoming change in therapists
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
## 2023-05-16 NOTE — REASON FOR VISIT
[Patient preference] : as per patient preference [Continuing, patient not seen in-person within last 12 months (provide details below)] : Telehealth services are continuing, patient not seen in-person within last 12 months.  [Telehealth (audio & video) - Individual/Group] : This visit was provided via telehealth using real-time 2-way audio visual technology. [Other Location: e.g. Home (Enter Location, City,State)___] : The provider was located at [unfilled]. [Home] : The patient, [unfilled], was located at home, [unfilled], at the time of the visit. [Verbal consent obtained from patient/other participant(s)] : Verbal consent for telehealth/telephonic services obtained from patient/other participant(s) [FreeTextEntry4] : 11:10 am [FreeTextEntry5] : 11:35 am [Patient] : Patient [FreeTextEntry1] : Psychotherapy follow up/second  termination session

## 2023-05-30 ENCOUNTER — OUTPATIENT (OUTPATIENT)
Dept: OUTPATIENT SERVICES | Facility: HOSPITAL | Age: 33
LOS: 1 days | End: 2023-05-30
Payer: COMMERCIAL

## 2023-05-30 ENCOUNTER — NON-APPOINTMENT (OUTPATIENT)
Age: 33
End: 2023-05-30

## 2023-05-30 ENCOUNTER — APPOINTMENT (OUTPATIENT)
Dept: PSYCHIATRY | Facility: CLINIC | Age: 33
End: 2023-05-30

## 2023-05-30 DIAGNOSIS — F31.9 BIPOLAR DISORDER, UNSPECIFIED: ICD-10-CM

## 2023-05-30 DIAGNOSIS — F33.1 MAJOR DEPRESSIVE DISORDER, RECURRENT, MODERATE: ICD-10-CM

## 2023-05-30 PROCEDURE — 90832 PSYTX W PT 30 MINUTES: CPT | Mod: 95

## 2023-05-30 NOTE — PHYSICAL EXAM
[Cooperative] : cooperative [Anxious] : anxious [Full] : full [Clear] : clear [Linear/Goal Directed] : linear/goal directed [None Reported] : none reported [Average] : average [WNL] : within normal limits [FreeTextEntry8] : PT is anxious and sad about the transition to a new therapist [de-identified] :  Pt has become more responsive and affect is full [de-identified] : PT ENDORSES ONGOING STABLE MOOD\par Some increase in anxiety due to upcoming change in therapists

## 2023-05-30 NOTE — DISCUSSION/SUMMARY
[FreeTextEntry1] : VM left for patient introducing self as new assigned therapist and inquired about availability to meet-contact information provided in voicemail.

## 2023-05-30 NOTE — REASON FOR VISIT
[Patient preference] : as per patient preference [Continuing, patient not seen in-person within last 12 months (provide details below)] : Telehealth services are continuing, patient not seen in-person within last 12 months.  [Telehealth (audio & video) - Individual/Group] : This visit was provided via telehealth using real-time 2-way audio visual technology. [Other Location: e.g. Home (Enter Location, City,State)___] : The provider was located at [unfilled]. [Home] : The patient, [unfilled], was located at home, [unfilled], at the time of the visit. [Verbal consent obtained from patient/other participant(s)] : Verbal consent for telehealth/telephonic services obtained from patient/other participant(s) [FreeTextEntry4] : 10:15 am [FreeTextEntry5] : 10:45 am [Patient] : Patient [FreeTextEntry1] : Psychotherapy follow up/Termination session

## 2023-05-30 NOTE — PLAN
[FreeTextEntry2] : SEE DISCUSSION/SUMMARY\par 4/4/23: Pt doing well and is now reduced to  biweekly therapy \par 5/2/2023; Pt advised that due to clinic policy, she will be transferred to another therapist in a few weeks. \par Bisi will terminate with this therapist and mood observed during transition\par Progress:  5/16/23 2nd termination session   Pt reviews progress in present therapy\par 5/30 23: This is the last session before the transfer to new therapist, Allison [Supportive Therapy] : Supportive Therapy [de-identified] : Pt endorses worry and anxiety and fears she may not have a therapist during the transition. She is assured that I will continue to see her as long as necessary until she has connected with new therapist\par The above appeared to calm Bisi and she does ask for another session. We made a tentative appointment and Allison is emailed as to Bisi's anxiety and readiness for transfer. \par During session we review the skills and progress she has made during our work together\par Bisi is more calm and continues to do well [FreeTextEntry1] : PLAN: Tentative session scheduled for 6/13

## 2023-05-31 DIAGNOSIS — F31.9 BIPOLAR DISORDER, UNSPECIFIED: ICD-10-CM

## 2023-06-06 ENCOUNTER — APPOINTMENT (OUTPATIENT)
Dept: PSYCHIATRY | Facility: CLINIC | Age: 33
End: 2023-06-06
Payer: COMMERCIAL

## 2023-06-06 ENCOUNTER — OUTPATIENT (OUTPATIENT)
Dept: OUTPATIENT SERVICES | Facility: HOSPITAL | Age: 33
LOS: 1 days | End: 2023-06-06
Payer: COMMERCIAL

## 2023-06-06 DIAGNOSIS — F31.9 BIPOLAR DISORDER, UNSPECIFIED: ICD-10-CM

## 2023-06-06 DIAGNOSIS — F43.10 POST-TRAUMATIC STRESS DISORDER, UNSPECIFIED: ICD-10-CM

## 2023-06-06 DIAGNOSIS — F41.1 GENERALIZED ANXIETY DISORDER: ICD-10-CM

## 2023-06-06 DIAGNOSIS — F33.1 MAJOR DEPRESSIVE DISORDER, RECURRENT, MODERATE: ICD-10-CM

## 2023-06-06 DIAGNOSIS — F51.05 INSOMNIA DUE TO OTHER MENTAL DISORDER: ICD-10-CM

## 2023-06-06 DIAGNOSIS — F41.0 GENERALIZED ANXIETY DISORDER: ICD-10-CM

## 2023-06-06 PROCEDURE — 99214 OFFICE O/P EST MOD 30 MIN: CPT | Mod: 95

## 2023-06-06 PROCEDURE — 90832 PSYTX W PT 30 MINUTES: CPT | Mod: 95

## 2023-06-06 RX ORDER — HYDROXYZINE HYDROCHLORIDE 25 MG/1
25 TABLET ORAL
Qty: 30 | Refills: 2 | Status: DISCONTINUED | COMMUNITY
Start: 2022-09-28 | End: 2023-06-06

## 2023-06-06 NOTE — PHYSICAL EXAM
[Average] : average [Intermittent] : intermittent [Slowed] : slowed [Euthymic] : euthymic [Constricted] : constricted [Clear] : clear [Poverty of content] : poverty of content [WNL] : within normal limits [None] : none [None Reported] : none reported [Fund of knowledge] : fund of knowledge [Moderate] : moderate [FreeTextEntry5] : less guarded [de-identified] : denies [de-identified] : poor
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
## 2023-06-06 NOTE — FAMILY HISTORY
[FreeTextEntry1] : Family composition: Lives alone in a studio apartment. Never  , no kids. \par Family history and background: Born and raised in [place]. Father passed away when she was a little girl, mother is alive. Older brother 45 y/o, and her sister is 49 y/o. \par Family relationship: Good with mother, close relationship with her siblings.  \par Pertinent Family Medical, MH and Substance Use History including Adult Child of Alcoholic and child of substance abuse status; history of cancer and heart disease:\par \par Father: passed away due to a car accident years ago. \par Mother 67 y/o: cholesterol, diabetes. \par \par \par

## 2023-06-07 DIAGNOSIS — F33.1 MAJOR DEPRESSIVE DISORDER, RECURRENT, MODERATE: ICD-10-CM

## 2023-06-12 ENCOUNTER — APPOINTMENT (OUTPATIENT)
Dept: PSYCHIATRY | Facility: CLINIC | Age: 33
End: 2023-06-12

## 2023-06-13 ENCOUNTER — APPOINTMENT (OUTPATIENT)
Dept: PSYCHIATRY | Facility: CLINIC | Age: 33
End: 2023-06-13

## 2023-06-13 ENCOUNTER — OUTPATIENT (OUTPATIENT)
Dept: OUTPATIENT SERVICES | Facility: HOSPITAL | Age: 33
LOS: 1 days | End: 2023-06-13
Payer: COMMERCIAL

## 2023-06-13 DIAGNOSIS — F31.9 BIPOLAR DISORDER, UNSPECIFIED: ICD-10-CM

## 2023-06-13 PROCEDURE — 90832 PSYTX W PT 30 MINUTES: CPT | Mod: 95
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
## 2023-06-13 NOTE — DISCUSSION/SUMMARY
[Initial Plan] : Initial Plan [Able to set and pursue goals] : able to set and pursue goals [Adherent to treatment recommendations] : adherent to treatment recommendations [Attempting to realize their potential] : Attempting to realize their potential [Awareness of substance use issues] : awareness of substance use issues [Intelligent] : intelligent [Motivated to participate in treatment] : motivated to participate in treatment [Part of a supportive family] : part of a supportive family [Involved in cultural/spiritual/Druze/community activities] : involved in cultural/spiritual/Druze/community activities [Connected to healthcare] : connected to healthcare [Mental Health] : Mental Health [Interpersonal Relationships] : Interpersonal Relationships [Continued - Progress made] : Continued - Progress made: [Initial] : Initial [Attained - As evidenced by] : Attained - As evidenced by: [every ___ weeks] : every [unfilled] weeks [None - Reason others did not participate:] : None - Reason others did not participate:  [Yes] : Yes [Psychiatric Provider/Prescriber] : Psychiatric Provider/Prescriber [Therapist] : Therapist [FreeTextEntry1] : 11/8/2022 [FreeTextEntry2] : 11/8/2023 [FreeTextEntry3] : 2/28/2022 [FreeTextEntry7] : PT IS RESPONSIVE AND COMPLIANT WITH TREATMENT [FreeTextEntry8] : NONE SEEN AT THIS TIME [FreeTextEntry9] : POSITIVE CONNECTION TO SPIRITUALITY [de-identified] : PSYCHIATRIST [FreeTextEntry4] : I WANT TO MANAGE MY ANGER WHICH CAUSES ME TO BE IMPULSIVE AND GET INTO TROUBLE [de-identified] : Continued progress as of 2/28/2023 [de-identified] : USE GLORIA FOR MEDIITATION  AND EXERCISE [de-identified] : 11/8/2023 [de-identified] : USES [employer] [de-identified] : I WANT TO LOOK FOR WORK [de-identified] : 11/08/2023 [de-identified] :  ... 2/28/23 Pt has been working FT for about 1 month [FreeTextEntry5] : \par NEW GOAL: 2/28/23   To be more social and improve communication with others thus reducing Social anxiety [de-identified] : STABLE MOOD AND RETURN TO WORK [de-identified] : NOT CLINICALLY INDICTATED

## 2023-06-13 NOTE — REASON FOR VISIT
[Patient preference] : as per patient preference [Continuing, patient not seen in-person within last 12 months (provide details below)] : Telehealth services are continuing, patient not seen in-person within last 12 months.  [Telehealth (audio & video) - Individual/Group] : This visit was provided via telehealth using real-time 2-way audio visual technology. [Other Location: e.g. Home (Enter Location, City,State)___] : The provider was located at [unfilled]. [Home] : The patient, [unfilled], was located at home, [unfilled], at the time of the visit. [Verbal consent obtained from patient/other participant(s)] : Verbal consent for telehealth/telephonic services obtained from patient/other participant(s) [FreeTextEntry4] : 10:30 am [FreeTextEntry5] : 11 am [FreeTextEntry1] : Psychotherapy follow up

## 2023-06-13 NOTE — PHYSICAL EXAM
[Cooperative] : cooperative [Anxious] : anxious [Full] : full [Clear] : clear [Linear/Goal Directed] : linear/goal directed [None Reported] : none reported [Average] : average [WNL] : within normal limits [FreeTextEntry8] : PT is anxious and sad about the transition to a new therapist [de-identified] :  Pt has become more responsive and affect is full [de-identified] : PT ENDORSES ONGOING STABLE MOOD\par Some increase in anxiety due to upcoming change in therapists

## 2023-06-13 NOTE — REASON FOR VISIT
[Patient preference] : as per patient preference [Access issues (e.g., transportation, impaired mobility, etc.)] : due to patient's access issues [Continuing, patient not seen in-person within last 12 months (provide details below)] : Telehealth services are continuing, patient not seen in-person within last 12 months.  [Telehealth (audio & video) - Individual/Group] : This visit was provided via telehealth using real-time 2-way audio visual technology. [Other Location: e.g. Home (Enter Location, City,State)___] : The provider was located at [unfilled]. [Home] : The patient, [unfilled], was located at home, [unfilled], at the time of the visit. [Verbal consent obtained from patient/other participant(s)] : Verbal consent for telehealth/telephonic services obtained from patient/other participant(s) [Patient] : Patient [FreeTextEntry4] : 10:15 am [FreeTextEntry5] : 10:50 am [FreeTextEntry1] : Psychotherapy follow up

## 2023-06-13 NOTE — DISCUSSION/SUMMARY
[Initial Plan] : Initial Plan [Able to set and pursue goals] : able to set and pursue goals [Adherent to treatment recommendations] : adherent to treatment recommendations [Attempting to realize their potential] : Attempting to realize their potential [Awareness of substance use issues] : awareness of substance use issues [Intelligent] : intelligent [Motivated to participate in treatment] : motivated to participate in treatment [Part of a supportive family] : part of a supportive family [Involved in cultural/spiritual/Gnosticist/community activities] : involved in cultural/spiritual/Gnosticist/community activities [Connected to healthcare] : connected to healthcare [FreeTextEntry2] : 11/8/2023 [FreeTextEntry3] : 2/28/2022 [FreeTextEntry7] : PT IS RESPONSIVE AND COMPLIANT WITH TREATMENT [FreeTextEntry8] : NONE SEEN AT THIS TIME [FreeTextEntry9] : POSITIVE CONNECTION TO SPIRITUALITY [de-identified] : PSYCHIATRIST [Initial] : Initial [Attained - As evidenced by] : Attained - As evidenced by: [every ___ weeks] : every [unfilled] weeks [Mental Health] : Mental Health [Interpersonal Relationships] : Interpersonal Relationships [Cultural] : Cultural [Continued - Progress made] : Continued - Progress made: [None - Reason others did not participate:] : None - Reason others did not participate:  [Yes] : Yes [Psychiatric Provider/Prescriber] : Psychiatric Provider/Prescriber [Therapist] : Therapist [de-identified] : Continued progress as of 2/28/2023.... 6/13 2023 Bisi begins to deal with her anger and past trauma [de-identified] : 11/8/2023 [de-identified] : USES [employer] as well as spirituality for moving forward [de-identified] :  Look for work [de-identified] : 11/08/2023 [de-identified] :  ... 2/28/23 Pt has been working FT for about 1 month [FreeTextEntry5] : \par NEW GOAL: 2/28/23   To be more social and improve communication with others thus reducing Social anxiety [FreeTextEntry1] : I am very anxious about making new friends and being in groups [FreeTextEntry4] : I want to be more social and less anxious in new situations [de-identified] : To accept her own value by learning better communication skills [de-identified] : STABLE MOOD AND RETURN TO WORK [de-identified] : NOT CLINICALLY INDICTATED

## 2023-06-13 NOTE — PHYSICAL EXAM
[Cooperative] : cooperative [Depressed] : depressed [Anxious] : anxious [Full] : full [Clear] : clear [Linear/Goal Directed] : linear/goal directed [None Reported] : none reported [Average] : average [WNL] : within normal limits [de-identified] :  Pt has become more responsive and affect is full [de-identified] : PT ENDORSES INCREASE IN ANXIETY AND DEPRESSION
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
## 2023-06-14 DIAGNOSIS — F31.9 BIPOLAR DISORDER, UNSPECIFIED: ICD-10-CM

## 2023-06-27 ENCOUNTER — OUTPATIENT (OUTPATIENT)
Dept: OUTPATIENT SERVICES | Facility: HOSPITAL | Age: 33
LOS: 1 days | End: 2023-06-27
Payer: COMMERCIAL

## 2023-06-27 ENCOUNTER — APPOINTMENT (OUTPATIENT)
Dept: PSYCHIATRY | Facility: CLINIC | Age: 33
End: 2023-06-27

## 2023-06-27 DIAGNOSIS — F31.9 BIPOLAR DISORDER, UNSPECIFIED: ICD-10-CM

## 2023-06-27 PROCEDURE — 90832 PSYTX W PT 30 MINUTES: CPT | Mod: 95

## 2023-06-27 NOTE — REASON FOR VISIT
[Patient preference] : as per patient preference [Access issues (e.g., transportation, impaired mobility, etc.)] : due to patient's access issues [Continuing, patient not seen in-person within last 12 months (provide details below)] : Telehealth services are continuing, patient not seen in-person within last 12 months.  [Telehealth (audio & video) - Individual/Group] : This visit was provided via telehealth using real-time 2-way audio visual technology. [Other Location: e.g. Home (Enter Location, City,State)___] : The provider was located at [unfilled]. [Home] : The patient, [unfilled], was located at home, [unfilled], at the time of the visit. [Verbal consent obtained from patient/other participant(s)] : Verbal consent for telehealth/telephonic services obtained from patient/other participant(s) [FreeTextEntry4] : 10:20 am [FreeTextEntry5] : 10:35 am [Patient] : Patient [FreeTextEntry1] : Psychotherapy follow up

## 2023-06-27 NOTE — PHYSICAL EXAM
[Cooperative] : cooperative [Euthymic] : euthymic [Depressed] : depressed [Full] : full [Clear] : clear [Linear/Goal Directed] : linear/goal directed [None Reported] : none reported [Average] : average [WNL] : within normal limits [de-identified] :  Pt has become more responsive and affect is full [de-identified] :  Mood is improved and affect is positive

## 2023-06-27 NOTE — PLAN
[FreeTextEntry2] : SEE DISCUSSION/SUMMARY for update\par  [Cognitive and/or Behavior Therapy] : Cognitive and/or Behavior Therapy  [Psychodynamic Therapy] : Psychodynamic Therapy  [Skills training (all types)] : Skills training (all types)  [de-identified] : Bisi endorses improvement in mood and affect is more positive and responsive. She states she continues to chat with friends online and attends gym as well as social events (Juneteenth celebration at [facility])\par Bisi reports good sleep and functioning well at work and improving in other activities (social)\par Bisi continues to c/o some "loud negative thoughts" and we discussed a CB exercise involving the thought and how to reframe content in a positive manner\par Bisi is responsive an dis willing to try to do the charting exercise.\par  [FreeTextEntry1] : PLAN: Next session scheduled for 2 weeks, on July 11

## 2023-06-27 NOTE — PLAN
[FreeTextEntry2] : SEE DISCUSSION/SUMMARY for update\par  [Cognitive and/or Behavior Therapy] : Cognitive and/or Behavior Therapy  [Psychodynamic Therapy] : Psychodynamic Therapy  [Skills training (all types)] : Skills training (all types)  [de-identified] : Bisi endorses improvement in mood and affect is more positive and responsive. She states she continues to chat with friends online and attends gym as well as social events (Juneteenth celebration at [facility])\par Bisi reports good sleep and functioning well at work and improving in other activities (social)\par Bisi continues to c/o some "loud negative thoughts" and we discussed a CB exercise involving the thought and how to reframe content in a positive manner\par Bisi is responsive an dis willing to try to do the charting exercise.\par  [FreeTextEntry1] : PLAN: Next session scheduled for 2 weeks, on July 11

## 2023-06-27 NOTE — PHYSICAL EXAM
[Cooperative] : cooperative [Euthymic] : euthymic [Depressed] : depressed [Full] : full [Clear] : clear [Linear/Goal Directed] : linear/goal directed [None Reported] : none reported [Average] : average [WNL] : within normal limits [de-identified] :  Pt has become more responsive and affect is full [de-identified] :  Mood is improved and affect is positive

## 2023-06-28 DIAGNOSIS — F31.9 BIPOLAR DISORDER, UNSPECIFIED: ICD-10-CM

## 2023-06-29 NOTE — DISCUSSION/SUMMARY
[Initial Plan] : Initial Plan [Able to set and pursue goals] : able to set and pursue goals [Adherent to treatment recommendations] : adherent to treatment recommendations [Attempting to realize their potential] : Attempting to realize their potential [Awareness of substance use issues] : awareness of substance use issues [Intelligent] : intelligent [Motivated to participate in treatment] : motivated to participate in treatment [Part of a supportive family] : part of a supportive family [Involved in cultural/spiritual/Quaker/community activities] : involved in cultural/spiritual/Quaker/community activities [Connected to healthcare] : connected to healthcare [Initial] : Initial [Attained - As evidenced by] : Attained - As evidenced by: [every ___ weeks] : every [unfilled] weeks [None - Reason others did not participate:] : None - Reason others did not participate:  [Yes] : Yes [Psychiatric Provider/Prescriber] : Psychiatric Provider/Prescriber [Therapist] : Therapist [Mental Health] : Mental Health [Interpersonal Relationships] : Interpersonal Relationships [Cultural] : Cultural [Continued - Progress made] : Continued - Progress made: [FreeTextEntry2] : 11/8/2023 [FreeTextEntry3] :  9/28/2022 (Late due to no timely     9/28/2022 (NO session on 10/28)           [FreeTextEntry7] : PT IS RESPONSIVE AND COMPLIANT WITH TREATMENT [FreeTextEntry8] : NONE SEEN AT THIS TIME [FreeTextEntry9] : POSITIVE CONNECTION TO SPIRITUALITY [de-identified] : PSYCHIATRIST [de-identified] : Continued progress as of 2/28/2023.... 6/13 2023 Bisi begins to deal with her anger and past trauma [de-identified] : 11/8/2023 [de-identified] : USES [employer] as well as spirituality for moving forward [de-identified] :  Look for work [de-identified] : 11/08/2023 [de-identified] :  ... 2/28/23 Pt has been working FT for about 1 month [FreeTextEntry5] : \par NEW GOAL: 2/28/23   To be more social and improve communication with others thus reducing Social anxiety [FreeTextEntry1] : I am very anxious about making new friends and being in groups [FreeTextEntry4] : I want to be more social and less anxious in new situations [de-identified] : To accept her own value by learning better communication skills [de-identified] : STABLE MOOD AND RETURN TO WORK [de-identified] : NOT CLINICALLY INDICTATED

## 2023-06-29 NOTE — DISCUSSION/SUMMARY
[Initial Plan] : Initial Plan [Able to set and pursue goals] : able to set and pursue goals [Adherent to treatment recommendations] : adherent to treatment recommendations [Attempting to realize their potential] : Attempting to realize their potential [Awareness of substance use issues] : awareness of substance use issues [Intelligent] : intelligent [Motivated to participate in treatment] : motivated to participate in treatment [Part of a supportive family] : part of a supportive family [Involved in cultural/spiritual/Presybeterian/community activities] : involved in cultural/spiritual/Presybeterian/community activities [Connected to healthcare] : connected to healthcare [Initial] : Initial [Attained - As evidenced by] : Attained - As evidenced by: [every ___ weeks] : every [unfilled] weeks [None - Reason others did not participate:] : None - Reason others did not participate:  [Yes] : Yes [Psychiatric Provider/Prescriber] : Psychiatric Provider/Prescriber [Therapist] : Therapist [Mental Health] : Mental Health [Interpersonal Relationships] : Interpersonal Relationships [Cultural] : Cultural [Continued - Progress made] : Continued - Progress made: [FreeTextEntry2] : 11/8/2023 [FreeTextEntry3] :  9/28/2022 (Late due to no timely     9/28/2022 (NO session on 10/28)           [FreeTextEntry7] : PT IS RESPONSIVE AND COMPLIANT WITH TREATMENT [FreeTextEntry8] : NONE SEEN AT THIS TIME [FreeTextEntry9] : POSITIVE CONNECTION TO SPIRITUALITY [de-identified] : PSYCHIATRIST [de-identified] : Continued progress as of 2/28/2023.... 6/13 2023 Bisi begins to deal with her anger and past trauma [de-identified] : 11/8/2023 [de-identified] : USES [employer] as well as spirituality for moving forward [de-identified] :  Look for work [de-identified] : 11/08/2023 [de-identified] :  ... 2/28/23 Pt has been working FT for about 1 month [FreeTextEntry5] : \par NEW GOAL: 2/28/23   To be more social and improve communication with others thus reducing Social anxiety [FreeTextEntry1] : I am very anxious about making new friends and being in groups [FreeTextEntry4] : I want to be more social and less anxious in new situations [de-identified] : To accept her own value by learning better communication skills [de-identified] : STABLE MOOD AND RETURN TO WORK [de-identified] : NOT CLINICALLY INDICTATED

## 2023-06-29 NOTE — DISCUSSION/SUMMARY
[Initial Plan] : Initial Plan [Able to set and pursue goals] : able to set and pursue goals [Adherent to treatment recommendations] : adherent to treatment recommendations [Attempting to realize their potential] : Attempting to realize their potential [Awareness of substance use issues] : awareness of substance use issues [Intelligent] : intelligent [Motivated to participate in treatment] : motivated to participate in treatment [Part of a supportive family] : part of a supportive family [Involved in cultural/spiritual/Jew/community activities] : involved in cultural/spiritual/Jew/community activities [Connected to healthcare] : connected to healthcare [Initial] : Initial [Attained - As evidenced by] : Attained - As evidenced by: [every ___ weeks] : every [unfilled] weeks [None - Reason others did not participate:] : None - Reason others did not participate:  [Yes] : Yes [Psychiatric Provider/Prescriber] : Psychiatric Provider/Prescriber [Therapist] : Therapist [Mental Health] : Mental Health [Interpersonal Relationships] : Interpersonal Relationships [Cultural] : Cultural [Continued - Progress made] : Continued - Progress made: [FreeTextEntry2] : 11/8/2023 [FreeTextEntry3] :  9/28/2022 (Late due to no timely     9/28/2022 (NO session on 10/28)           [FreeTextEntry7] : PT IS RESPONSIVE AND COMPLIANT WITH TREATMENT [FreeTextEntry8] : NONE SEEN AT THIS TIME [FreeTextEntry9] : POSITIVE CONNECTION TO SPIRITUALITY [de-identified] : PSYCHIATRIST [de-identified] : Continued progress as of 2/28/2023.... 6/13 2023 Bisi begins to deal with her anger and past trauma [de-identified] : USES [employer] as well as spirituality for moving forward [de-identified] : 11/8/2023 [de-identified] :  Look for work [de-identified] : 11/08/2023 [de-identified] :  ... 2/28/23 Pt has been working FT for about 1 month [FreeTextEntry5] : \par NEW GOAL: 2/28/23   To be more social and improve communication with others thus reducing Social anxiety [FreeTextEntry1] : I am very anxious about making new friends and being in groups [FreeTextEntry4] : I want to be more social and less anxious in new situations [de-identified] : To accept her own value by learning better communication skills [de-identified] : STABLE MOOD AND RETURN TO WORK [de-identified] : NOT CLINICALLY INDICTATED

## 2023-07-11 ENCOUNTER — NON-APPOINTMENT (OUTPATIENT)
Age: 33
End: 2023-07-11

## 2023-07-11 ENCOUNTER — APPOINTMENT (OUTPATIENT)
Dept: PSYCHIATRY | Facility: CLINIC | Age: 33
End: 2023-07-11

## 2023-07-12 ENCOUNTER — APPOINTMENT (OUTPATIENT)
Dept: PSYCHIATRY | Facility: CLINIC | Age: 33
End: 2023-07-12

## 2023-07-17 NOTE — DISCUSSION/SUMMARY
[Initial Plan] : Initial Plan [Able to set and pursue goals] : able to set and pursue goals [Adherent to treatment recommendations] : adherent to treatment recommendations [Attempting to realize their potential] : Attempting to realize their potential [Awareness of substance use issues] : awareness of substance use issues [Intelligent] : intelligent [Motivated to participate in treatment] : motivated to participate in treatment [Part of a supportive family] : part of a supportive family [Involved in cultural/spiritual/Taoist/community activities] : involved in cultural/spiritual/Taoist/community activities [Connected to healthcare] : connected to healthcare [FreeTextEntry2] : 11/8/2023 [FreeTextEntry3] : 2/28/2022 [FreeTextEntry7] : PT IS RESPONSIVE AND COMPLIANT WITH TREATMENT [FreeTextEntry8] : NONE SEEN AT THIS TIME [FreeTextEntry9] : POSITIVE CONNECTION TO SPIRITUALITY [de-identified] : PSYCHIATRIST [Initial] : Initial [Attained - As evidenced by] : Attained - As evidenced by: [every ___ weeks] : every [unfilled] weeks [Mental Health] : Mental Health [Interpersonal Relationships] : Interpersonal Relationships [Cultural] : Cultural [Continued - Progress made] : Continued - Progress made: [None - Reason others did not participate:] : None - Reason others did not participate:  [Yes] : Yes [Psychiatric Provider/Prescriber] : Psychiatric Provider/Prescriber [Therapist] : Therapist [de-identified] : Continued progress as of 2/28/2023.... 6/13 2023 Bisi begins to deal with her anger and past trauma [de-identified] : 11/8/2023 [de-identified] : USES [facility] as well as spirituality for moving forward [de-identified] :  Look for work [de-identified] : 11/08/2023 [de-identified] :  ... 2/28/23 Pt has been working FT for about 1 month [FreeTextEntry5] : \par NEW GOAL: 2/28/23   To be more social and improve communication with others thus reducing Social anxiety [FreeTextEntry4] : I want to be more social and less anxious in new situations [de-identified] : To accept her own value by learning better communication skills [de-identified] : STABLE MOOD AND RETURN TO WORK [de-identified] : NOT CLINICALLY INDICTATED [FreeTextEntry1] :  Bisi had called after missing scheduled appointment on . She stated that her Health insurance had .\par Bisi is contacted  by phone and Voice Message is left providing contact information for the purpose of working out the insurance issues. She is encouraged to call the manager for assistance in working out her insurance problem so she can continue her treatment

## 2023-07-17 NOTE — DISCUSSION/SUMMARY
[Initial Plan] : Initial Plan [Able to set and pursue goals] : able to set and pursue goals [Adherent to treatment recommendations] : adherent to treatment recommendations [Attempting to realize their potential] : Attempting to realize their potential [Awareness of substance use issues] : awareness of substance use issues [Intelligent] : intelligent [Motivated to participate in treatment] : motivated to participate in treatment [Part of a supportive family] : part of a supportive family [Involved in cultural/spiritual/Taoism/community activities] : involved in cultural/spiritual/Taoism/community activities [Connected to healthcare] : connected to healthcare [FreeTextEntry2] : 11/8/2023 [FreeTextEntry3] : 2/28/2022 [FreeTextEntry7] : PT IS RESPONSIVE AND COMPLIANT WITH TREATMENT [FreeTextEntry8] : NONE SEEN AT THIS TIME [FreeTextEntry9] : POSITIVE CONNECTION TO SPIRITUALITY [de-identified] : PSYCHIATRIST [Initial] : Initial [Attained - As evidenced by] : Attained - As evidenced by: [every ___ weeks] : every [unfilled] weeks [Mental Health] : Mental Health [Interpersonal Relationships] : Interpersonal Relationships [Cultural] : Cultural [Continued - Progress made] : Continued - Progress made: [None - Reason others did not participate:] : None - Reason others did not participate:  [Yes] : Yes [Psychiatric Provider/Prescriber] : Psychiatric Provider/Prescriber [Therapist] : Therapist [de-identified] : Continued progress as of 2/28/2023.... 6/13 2023 Bisi begins to deal with her anger and past trauma [de-identified] : 11/8/2023 [de-identified] : USES [employer] as well as spirituality for moving forward [de-identified] :  Look for work [de-identified] : 11/08/2023 [de-identified] :  ... 2/28/23 Pt has been working FT for about 1 month [FreeTextEntry5] : \par NEW GOAL: 2/28/23   To be more social and improve communication with others thus reducing Social anxiety [FreeTextEntry4] : I want to be more social and less anxious in new situations [de-identified] : To accept her own value by learning better communication skills [de-identified] : STABLE MOOD AND RETURN TO WORK [de-identified] : NOT CLINICALLY INDICTATED [FreeTextEntry1] :  Bisi had called after missing scheduled appointment on . She stated that her Health insurance had .\par Bisi is contacted  by phone and Voice Message is left providing contact information for the purpose of working out the insurance issues. She is encouraged to call the manager for assistance in working out her insurance problem so she can continue her treatment

## 2023-08-03 ENCOUNTER — NON-APPOINTMENT (OUTPATIENT)
Age: 33
End: 2023-08-03

## 2023-08-04 NOTE — DISCUSSION/SUMMARY
[FreeTextEntry1] : To date Bisi has not responded to outreach and has missed her last appointment both with Psychiatrist and this therapist   After a communication with Dr Carl as well as an email to court Doyle it was decided to send 10 day letter to Bisi

## 2023-08-16 ENCOUNTER — NON-APPOINTMENT (OUTPATIENT)
Age: 33
End: 2023-08-16

## 2023-08-16 NOTE — DISCUSSION/SUMMARY
[FreeTextEntry1] : Bisi has not been seen in this clinic since June 6 2023. Psychiatrist, Dr Carl and nI have collaborated and agree to close this case as of today, 8/16/2023

## 2023-08-16 NOTE — REASON FOR VISIT
[Left against clinical advice] : Left against clinical advice [FreeTextEntry9] : 9/28/2022 [FreeTextEntry8] : 8/16/2023 [FreeTextEntry1] : Bisi had been compliant with scheduled sessions but then began to miss sessions. She had been outreached several times by both therapist and psychiatrist but has not responded. A 10 day letter was sent to PT on 8/4 but there has been no response  [FreeTextEntry2] : PT was court mandated due to history of incarceration Court contact; Doyle Alberto was notified that Pt is no longer coming to sessions.

## 2023-08-16 NOTE — HISTORY OF PRESENT ILLNESS
[Violence or Homicidal Behavior/Ideation] : violence or homicidal behavior/ideation [Not Applicable] : Not applicable [FreeTextEntry1] : PT is diagnosed with BIPOLAR disorder but stabilized with treatment and returned to work Pt has not been a risk to herself or others during her treatment at this clinic and was responsive to Treatment [FreeTextEntry3] : Pt has not been compliant with Psychiatrist, so it is not known if she continues to be medicated. [FreeTextEntry2] : Last IPP was in October 2021 and then she was mandated to treatment by Court

## 2024-03-01 ENCOUNTER — OUTPATIENT (OUTPATIENT)
Dept: OUTPATIENT SERVICES | Facility: HOSPITAL | Age: 34
LOS: 1 days | End: 2024-03-01
Payer: COMMERCIAL

## 2024-03-01 ENCOUNTER — APPOINTMENT (OUTPATIENT)
Dept: PSYCHIATRY | Facility: CLINIC | Age: 34
End: 2024-03-01

## 2024-03-01 DIAGNOSIS — F41.0 PANIC DISORDER [EPISODIC PAROXYSMAL ANXIETY]: ICD-10-CM

## 2024-03-01 PROCEDURE — 90834 PSYTX W PT 45 MINUTES: CPT

## 2024-03-01 NOTE — PHYSICAL EXAM
[Slowed] : slowed [Cooperative] : cooperative [Depressed] : depressed [Constricted] : constricted [Clear] : clear [Linear/Goal Directed] : linear/goal directed [Depressive] : depressive [Average] : average [WNL] : within normal limits [de-identified] : Pt has been very depressed and needs to restart medication  She denies any risk to self or others

## 2024-03-01 NOTE — PLAN
[FreeTextEntry2] :  Pt has returned to therapy and needs to restart medication Patient reports she is very depressed  [Other: ____] : [unfilled] [de-identified] : First session to restart medication and therapy  Pt is very depressed a   evidenced by low motivation and tearfulness. She continues her FT job and functions well at work. She reports she is isolated except for seeing a GF 2x per month and in daily contact. She denies any Triggers except discontinuing her Psych meds  Pt agrees to restarting treatment at this clinic [FreeTextEntry1] : PLAN: Connect with Psychiatrist and FU for weekly therapy

## 2024-03-01 NOTE — REASON FOR VISIT
[Patient] : Patient [FreeTextEntry1] : Pt returns to therapy due to feeling depressed and not having medication   She will be restarted with Psychiatrist, Dr FLAHERTY

## 2024-03-02 DIAGNOSIS — F41.0 PANIC DISORDER [EPISODIC PAROXYSMAL ANXIETY]: ICD-10-CM

## 2024-03-07 ENCOUNTER — OUTPATIENT (OUTPATIENT)
Dept: OUTPATIENT SERVICES | Facility: HOSPITAL | Age: 34
LOS: 1 days | End: 2024-03-07
Payer: COMMERCIAL

## 2024-03-07 ENCOUNTER — APPOINTMENT (OUTPATIENT)
Dept: PSYCHIATRY | Facility: CLINIC | Age: 34
End: 2024-03-07
Payer: COMMERCIAL

## 2024-03-07 DIAGNOSIS — F51.05 INSOMNIA DUE TO OTHER MENTAL DISORDER: ICD-10-CM

## 2024-03-07 DIAGNOSIS — F43.10 POST-TRAUMATIC STRESS DISORDER, UNSPECIFIED: ICD-10-CM

## 2024-03-07 DIAGNOSIS — F31.9 BIPOLAR DISORDER, UNSPECIFIED: ICD-10-CM

## 2024-03-07 DIAGNOSIS — F31.32 BIPOLAR DISORDER, CURRENT EPISODE DEPRESSED, MODERATE: ICD-10-CM

## 2024-03-07 DIAGNOSIS — F41.1 GENERALIZED ANXIETY DISORDER: ICD-10-CM

## 2024-03-07 PROCEDURE — 99214 OFFICE O/P EST MOD 30 MIN: CPT | Mod: 95
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
## 2024-03-07 NOTE — PHYSICAL EXAM
[Average] : average [Intermittent] : intermittent [Slowed] : slowed [Constricted] : constricted [Clear] : clear [WNL] : within normal limits [None] : none [None Reported] : none reported [Fund of knowledge] : fund of knowledge [Moderate] : moderate [Depressed] : depressed [Linear/Goal Directed] : linear/goal directed [FreeTextEntry5] : less guarded [de-identified] : denies [de-identified] : poor
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
## 2024-03-07 NOTE — DISCUSSION/SUMMARY
[FreeTextEntry1] : Pt presents with hx of Bipolar I disorder with psychotic features, PTSD and Anxiety. Pt was a previous patient at clinic, discharged in june 2023 after noncompliance with sessions as pt reports she lost her insurance. Pt was previously on regimen of Prozac 20mg, Olanzapine 20mg and Lorazepam 0.5mg qd PRN  which worked well however due to not following with a provider after her discharge, she has been off medicaitons since August 2023. Will restart Prozac at 10mgqd, olanzapine at 5mg qd and Lorazepam 0.5mg qd PRN as it has been several months since pt has last taken medications and will titrate upwards to therapeutic doses pending pt's progress.   Diff Dx: Bipolar I disorder current episode depressed, moderate. PTSD, SHAI with panic attacks, Personality Disorder NOS  PLAN  1. Next appt in 2 weeks 2. Restart Prozac 10mg with plans to titrate upwards 3. Restart Olanzapine 5mg qd for mood stabilization 4. Restart Lorazepam 0.5mg qd PRN for anxiety/panic 5. Continue therapy 6. Pt to send copy of recently completed bloodwork done last week to provider

## 2024-03-07 NOTE — FAMILY HISTORY
[FreeTextEntry1] : Family composition: Lives alone in a studio apartment. Never  , no kids. \par  Family history and background: Born and raised in [place]. Father passed away when she was a little girl, mother is alive. Older brother 45 y/o, and her sister is 49 y/o. \par  Family relationship: Good with mother, close relationship with her siblings.  \par  Pertinent Family Medical, MH and Substance Use History including Adult Child of Alcoholic and child of substance abuse status; history of cancer and heart disease:\par  \par  Father: passed away due to a car accident years ago. \par  Mother 67 y/o: cholesterol, diabetes. \par  \par  \par

## 2024-03-07 NOTE — REASON FOR VISIT
[Telehealth (audio & video) - Individual/Group] : This visit was provided via telehealth using real-time 2-way audio visual technology. [Home] : The patient, [unfilled], was located at home, [unfilled], at the time of the visit. [Verbal consent obtained from patient/other participant(s)] : Verbal consent for telehealth/telephonic services obtained from patient/other participant(s) [Evaluation] : evaluation of [FreeTextEntry1] : Bipolar disorder/Anxiety, Depression.  [FreeTextEntry2] : Self
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
## 2024-03-08 DIAGNOSIS — F31.32 BIPOLAR DISORDER, CURRENT EPISODE DEPRESSED, MODERATE: ICD-10-CM

## 2024-03-08 DIAGNOSIS — F41.1 GENERALIZED ANXIETY DISORDER: ICD-10-CM

## 2024-03-08 DIAGNOSIS — F51.05 INSOMNIA DUE TO OTHER MENTAL DISORDER: ICD-10-CM

## 2024-03-08 DIAGNOSIS — F43.10 POST-TRAUMATIC STRESS DISORDER, UNSPECIFIED: ICD-10-CM

## 2024-03-12 ENCOUNTER — OUTPATIENT (OUTPATIENT)
Dept: OUTPATIENT SERVICES | Facility: HOSPITAL | Age: 34
LOS: 1 days | End: 2024-03-12
Payer: COMMERCIAL

## 2024-03-12 ENCOUNTER — APPOINTMENT (OUTPATIENT)
Dept: PSYCHIATRY | Facility: CLINIC | Age: 34
End: 2024-03-12

## 2024-03-12 DIAGNOSIS — F31.9 BIPOLAR DISORDER, UNSPECIFIED: ICD-10-CM

## 2024-03-12 DIAGNOSIS — F41.0 PANIC DISORDER [EPISODIC PAROXYSMAL ANXIETY]: ICD-10-CM

## 2024-03-12 PROCEDURE — 90832 PSYTX W PT 30 MINUTES: CPT

## 2024-03-12 NOTE — REASON FOR VISIT
[Patient preference] : as per patient preference [Starting, patient seen in-person within last 6 months] : Telehealth services are being started as patient has seen in-person within last 6 months. [Telehealth (audio & video) - Individual/Group] : This visit was provided via telehealth using real-time 2-way audio visual technology. [Other Location: e.g. Home (Enter Location, City,State)___] : The provider was located at [unfilled]. [Home] : The patient, [unfilled], was located at home, [unfilled], at the time of the visit. [Verbal consent obtained from patient/other participant(s)] : Verbal consent for telehealth/telephonic services obtained from patient/other participant(s) [FreeTextEntry4] : 10:15 am [FreeTextEntry5] : 10:45 am [Patient] : Patient [FreeTextEntry1] : Psychotherapy follow up for mood stabilization and dysthymia

## 2024-03-12 NOTE — PHYSICAL EXAM
[Slowed] : slowed [Cooperative] : cooperative [Depressed] : depressed [Constricted] : constricted [Clear] : clear [Linear/Goal Directed] : linear/goal directed [Depressive] : depressive [Average] : average [WNL] : within normal limits [de-identified] : Pt has been restarted on medication and states she feels a little better

## 2024-03-12 NOTE — PLAN
[FreeTextEntry2] :  Pt has returned to therapy and needs to restart medication Patient reports she is very depressed 3/12/24: Bisi has been evaluated by DR FLAHERTY and medication is restarted Pt will continue compliance with treatment recommendations  [Psychodynamic Therapy] : Psychodynamic Therapy  [Other: ____] : [unfilled] [de-identified] :  In session Bisi is alert, oriented and discusses her family and plans she has for the weekend to celebrate Mothers Birthday Bisi is interested in finding a [employer] which is LGBTQ friendly. During a search during session Bisi is informed that there are 1 or 2 on [place] and she plans to explore these options. Bisi will explore needs and goals for therapy and will discuss in next sessions going forward [FreeTextEntry1] : PLAN: Next session scheduled for 3/26

## 2024-03-13 DIAGNOSIS — F31.9 BIPOLAR DISORDER, UNSPECIFIED: ICD-10-CM

## 2024-03-25 ENCOUNTER — APPOINTMENT (OUTPATIENT)
Dept: PSYCHIATRY | Facility: CLINIC | Age: 34
End: 2024-03-25
Payer: COMMERCIAL

## 2024-03-25 ENCOUNTER — OUTPATIENT (OUTPATIENT)
Dept: OUTPATIENT SERVICES | Facility: HOSPITAL | Age: 34
LOS: 1 days | End: 2024-03-25
Payer: COMMERCIAL

## 2024-03-25 DIAGNOSIS — F43.10 POST-TRAUMATIC STRESS DISORDER, UNSPECIFIED: ICD-10-CM

## 2024-03-25 DIAGNOSIS — F41.1 GENERALIZED ANXIETY DISORDER: ICD-10-CM

## 2024-03-25 DIAGNOSIS — F51.05 INSOMNIA DUE TO OTHER MENTAL DISORDER: ICD-10-CM

## 2024-03-25 DIAGNOSIS — F41.0 PANIC DISORDER [EPISODIC PAROXYSMAL ANXIETY]: ICD-10-CM

## 2024-03-25 DIAGNOSIS — F31.32 BIPOLAR DISORDER, CURRENT EPISODE DEPRESSED, MODERATE: ICD-10-CM

## 2024-03-25 PROCEDURE — 99214 OFFICE O/P EST MOD 30 MIN: CPT | Mod: 95

## 2024-03-25 NOTE — FAMILY HISTORY
[FreeTextEntry1] : Family composition: Lives alone in a studio apartment. Never  , no kids. \par  Family history and background: Born and raised in [place]. Father passed away when she was a little girl, mother is alive. Older brother 45 y/o, and her sister is 49 y/o. \par  Family relationship: Good with mother, close relationship with her siblings.  \par  Pertinent Family Medical, MH and Substance Use History including Adult Child of Alcoholic and child of substance abuse status; history of cancer and heart disease:\par  \par  Father: passed away due to a car accident years ago. \par  Mother 67 y/o: cholesterol, diabetes. \par  \par  \par

## 2024-03-25 NOTE — DISCUSSION/SUMMARY
[FreeTextEntry1] : Pt presents with hx of Bipolar I disorder with psychotic features, PTSD and Anxiety. Pt was a previous patient at clinic, discharged in june 2023 after noncompliance with sessions as pt reports she lost her insurance. Pt was previously on regimen of Prozac 20mg, Olanzapine 20mg and Lorazepam 0.5mg qd PRN  which worked well however due to not following with a provider after her discharge, she has been off medicaitons since August 2023.   3/25 Pt reports some slight improvement in depression since restarting prior medication regimen. Will titrate prozac at this time to aid with depressive symptoms. Pt to send in recently completed bloodwork.  Diff Dx: Bipolar I disorder current episode depressed, moderate. PTSD, SHAI with panic attacks, Personality Disorder NOS  PLAN  1. Next appt in 1 month 2. Increase Prozac to 20mg for depression 3. continue Olanzapine 5mg qd for mood stabilization 4. continue Lorazepam 0.5mg qd PRN for anxiety/panic 5. Continue therapy 6. Pt to send copy of recently completed bloodwork done last week to provider
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
## 2024-03-25 NOTE — PHYSICAL EXAM
[Intermittent] : intermittent [Average] : average [Slowed] : slowed [Depressed] : depressed [Clear] : clear [Constricted] : constricted [Linear/Goal Directed] : linear/goal directed [WNL] : within normal limits [None] : none [None Reported] : none reported [Fund of knowledge] : fund of knowledge [Moderate] : moderate [FreeTextEntry5] : less guarded [de-identified] : denies [de-identified] : poor

## 2024-03-25 NOTE — REASON FOR VISIT
[Telehealth (audio & video) - Individual/Group] : This visit was provided via telehealth using real-time 2-way audio visual technology. [Other Location: e.g. Home (Enter Location, City,State)___] : The provider was located at [unfilled]. [Verbal consent obtained from patient/other participant(s)] : Verbal consent for telehealth/telephonic services obtained from patient/other participant(s) [Home] : The patient, [unfilled], was located at home, [unfilled], at the time of the visit. [Evaluation] : evaluation of [FreeTextEntry1] : Bipolar disorder/Anxiety, Depression.  [FreeTextEntry2] : Self
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
## 2024-03-26 ENCOUNTER — OUTPATIENT (OUTPATIENT)
Dept: OUTPATIENT SERVICES | Facility: HOSPITAL | Age: 34
LOS: 1 days | End: 2024-03-26
Payer: COMMERCIAL

## 2024-03-26 ENCOUNTER — APPOINTMENT (OUTPATIENT)
Dept: PSYCHIATRY | Facility: CLINIC | Age: 34
End: 2024-03-26

## 2024-03-26 DIAGNOSIS — F51.05 INSOMNIA DUE TO OTHER MENTAL DISORDER: ICD-10-CM

## 2024-03-26 DIAGNOSIS — F31.9 BIPOLAR DISORDER, UNSPECIFIED: ICD-10-CM

## 2024-03-26 DIAGNOSIS — F41.1 GENERALIZED ANXIETY DISORDER: ICD-10-CM

## 2024-03-26 DIAGNOSIS — F31.32 BIPOLAR DISORDER, CURRENT EPISODE DEPRESSED, MODERATE: ICD-10-CM

## 2024-03-26 DIAGNOSIS — F43.10 POST-TRAUMATIC STRESS DISORDER, UNSPECIFIED: ICD-10-CM

## 2024-03-26 DIAGNOSIS — F41.0 PANIC DISORDER [EPISODIC PAROXYSMAL ANXIETY]: ICD-10-CM

## 2024-03-26 PROCEDURE — 90832 PSYTX W PT 30 MINUTES: CPT

## 2024-03-26 NOTE — REASON FOR VISIT
[Patient preference] : as per patient preference [Continuing, patient seen in-person within last 12 months] : Telehealth services are continuing as patient has been seen in-person within last 12 months. [Telehealth (audio & video) - Individual/Group] : This visit was provided via telehealth using real-time 2-way audio visual technology. [Other Location: e.g. Home (Enter Location, City,State)___] : The provider was located at [unfilled]. [Home] : The patient, [unfilled], was located at home, [unfilled], at the time of the visit. [Verbal consent obtained from patient/other participant(s)] : Verbal consent for telehealth/telephonic services obtained from patient/other participant(s) [FreeTextEntry4] : 10 am [FreeTextEntry5] : 10:30 am [Patient] : Patient [FreeTextEntry1] : psychotherapy follow up for mood stabilization and depression

## 2024-03-26 NOTE — PHYSICAL EXAM
[Slowed] : slowed [Cooperative] : cooperative [Depressed] : depressed [Constricted] : constricted [Clear] : clear [Linear/Goal Directed] : linear/goal directed [Depressive] : depressive [Average] : average [WNL] : within normal limits [de-identified] : Pt has been restarted on medication and states she is feeling better

## 2024-03-26 NOTE — PLAN
[FreeTextEntry2] :  Pt has returned to therapy and needs to restart medication Patient reports she is very depressed 3/12/24: Bisi has been evaluated by DR FLAHERTY and medication is restarted Pt will continue compliance with treatment recommendations 3/26/24: Continued improvemant  [Psychodynamic Therapy] : Psychodynamic Therapy  [Supportive Therapy] : Supportive Therapy [de-identified] :  In session Bisi is alert, oriented and discusses her family and  ha s been having more contact with friend, Yahaira as well as Family  Bisi is concerned about weight gain and discusses need to return to exercising. Bisi will continue to explore needs and goals for therapy and will discuss in next sessions going forward [FreeTextEntry1] : PLAN: Next session scheduled for 4/9

## 2024-03-27 DIAGNOSIS — F31.9 BIPOLAR DISORDER, UNSPECIFIED: ICD-10-CM

## 2024-04-09 ENCOUNTER — APPOINTMENT (OUTPATIENT)
Dept: PSYCHIATRY | Facility: CLINIC | Age: 34
End: 2024-04-09

## 2024-04-09 ENCOUNTER — OUTPATIENT (OUTPATIENT)
Dept: OUTPATIENT SERVICES | Facility: HOSPITAL | Age: 34
LOS: 1 days | End: 2024-04-09
Payer: COMMERCIAL

## 2024-04-09 DIAGNOSIS — F33.3 MAJOR DEPRESSIVE DISORDER, RECURRENT, SEVERE WITH PSYCHOTIC SYMPTOMS: ICD-10-CM

## 2024-04-09 DIAGNOSIS — F31.9 BIPOLAR DISORDER, UNSPECIFIED: ICD-10-CM

## 2024-04-09 PROCEDURE — 90832 PSYTX W PT 30 MINUTES: CPT

## 2024-04-09 NOTE — PHYSICAL EXAM
[Cooperative] : cooperative [Euthymic] : euthymic [Full] : full [Clear] : clear [Linear/Goal Directed] : linear/goal directed [Hallucinations - Auditory] : hallucinations - auditory [Average] : average [WNL] : within normal limits [FreeTextEntry8] : Improved [de-identified] : Pt is aware these are hallucinations and she reports she is able to see them as such [de-identified] : Pt has been restarted on medication and states she is feeling better    However she is recently reporting some auditory hallucinations and Dr Jung is contacted for a new assessment

## 2024-04-09 NOTE — PLAN
[FreeTextEntry2] :  Pt has returned to therapy and needs to restart medication Patient reports she is very depressed 3/12/24: Bisi has been evaluated by DR FLAHERTY and medication is restarted Pt will continue compliance with treatment recommendations 3/26/24: Continued improvement 4/9/24. Pt is requesting increase in antipsychotic since she is reporting occasional auditory hallucinations. She is able to distract herself and Psychiatrist is informed (Mood is reported improved) [Psychodynamic Therapy] : Psychodynamic Therapy  [Supportive Therapy] : Supportive Therapy [de-identified] :  In session Bisi is alert, oriented and REPORTS MOOD IMPROVEMENT. "I AM FINALLY ABLE TO ENJOY THINGS" She is concerned about occasional auditory hallucinations which occur when she is quiet. She reports the voice is Male and is deprecatory. Psychiatrist is informed and will discuss with PT Otherwise, Bisi reports good sleep, positive mood and good functioning. She is responsive to a CB exercise to create a feeling of safety and control  [FreeTextEntry1] : PLAN: Next session scheduled for 2 weeks, 4/23

## 2024-04-09 NOTE — REASON FOR VISIT
[Patient preference] : as per patient preference [Continuing, patient seen in-person within last 12 months] : Telehealth services are continuing as patient has been seen in-person within last 12 months. [Telehealth (audio & video) - Individual/Group] : This visit was provided via telehealth using real-time 2-way audio visual technology. [Other Location: e.g. Home (Enter Location, City,State)___] : The provider was located at [unfilled]. [Verbal consent obtained from patient/other participant(s)] : Verbal consent for telehealth/telephonic services obtained from patient/other participant(s) [FreeTextEntry4] : 10:40 am [FreeTextEntry5] : 11:10 am [FreeTextEntry1] : Psychotherapy follow up for mood stabilization

## 2024-04-10 DIAGNOSIS — F33.3 MAJOR DEPRESSIVE DISORDER, RECURRENT, SEVERE WITH PSYCHOTIC SYMPTOMS: ICD-10-CM

## 2024-04-22 ENCOUNTER — OUTPATIENT (OUTPATIENT)
Dept: OUTPATIENT SERVICES | Facility: HOSPITAL | Age: 34
LOS: 1 days | End: 2024-04-22
Payer: COMMERCIAL

## 2024-04-22 ENCOUNTER — APPOINTMENT (OUTPATIENT)
Dept: PSYCHIATRY | Facility: CLINIC | Age: 34
End: 2024-04-22
Payer: COMMERCIAL

## 2024-04-22 DIAGNOSIS — F43.10 POST-TRAUMATIC STRESS DISORDER, UNSPECIFIED: ICD-10-CM

## 2024-04-22 DIAGNOSIS — F41.1 GENERALIZED ANXIETY DISORDER: ICD-10-CM

## 2024-04-22 DIAGNOSIS — F31.32 BIPOLAR DISORDER, CURRENT EPISODE DEPRESSED, MODERATE: ICD-10-CM

## 2024-04-22 DIAGNOSIS — F51.05 INSOMNIA DUE TO OTHER MENTAL DISORDER: ICD-10-CM

## 2024-04-22 PROCEDURE — 99214 OFFICE O/P EST MOD 30 MIN: CPT | Mod: 95
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
## 2024-04-22 NOTE — REASON FOR VISIT
[Telehealth (audio & video) - Individual/Group] : This visit was provided via telehealth using real-time 2-way audio visual technology. [Other Location: e.g. Home (Enter Location, City,State)___] : The provider was located at [unfilled]. [Home] : The patient, [unfilled], was located at home, [unfilled], at the time of the visit. [Verbal consent obtained from patient/other participant(s)] : Verbal consent for telehealth/telephonic services obtained from patient/other participant(s) [Evaluation] : evaluation of [FreeTextEntry1] : Bipolar disorder/Anxiety, Depression.  [FreeTextEntry2] : Self

## 2024-04-22 NOTE — PHYSICAL EXAM
[Average] : average [Intermittent] : intermittent [Slowed] : slowed [Depressed] : depressed [Constricted] : constricted [Clear] : clear [Linear/Goal Directed] : linear/goal directed [WNL] : within normal limits [None] : none [None Reported] : none reported [Fund of knowledge] : fund of knowledge [Moderate] : moderate [FreeTextEntry5] : less guarded [de-identified] : denies [de-identified] : poor

## 2024-04-22 NOTE — DISCUSSION/SUMMARY
[FreeTextEntry1] : Pt reports paranoia, will increase Olanzapine at this time to aid with paranoid thoughts. Pt in agreement.   Diff Dx: Bipolar I disorder current episode depressed, moderate. PTSD, SHAI with panic attacks, Personality Disorder NOS  PLAN  1. Next appt in 1 month 2. Continue Prozac  20mg for depression 3. Increase Olanzapine 7.5mg qd for mood stabilization 4. continue Lorazepam 0.5mg qd PRN for anxiety/panic 5. Continue therapy 6. Pt to send copy of recently completed bloodwork done last week to provider

## 2024-04-22 NOTE — FAMILY HISTORY
[FreeTextEntry1] : Family composition: Lives alone in a studio apartment. Never  , no kids. \par  Family history and background: Born and raised in [place]. Father passed away when she was a little girl, mother is alive. Older brother 47 y/o, and her sister is 49 y/o. \par  Family relationship: Good with mother, close relationship with her siblings.  \par  Pertinent Family Medical, MH and Substance Use History including Adult Child of Alcoholic and child of substance abuse status; history of cancer and heart disease:\par  \par  Father: passed away due to a car accident years ago. \par  Mother 67 y/o: cholesterol, diabetes. \par  \par  \par
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
## 2024-04-23 ENCOUNTER — OUTPATIENT (OUTPATIENT)
Dept: OUTPATIENT SERVICES | Facility: HOSPITAL | Age: 34
LOS: 1 days | End: 2024-04-23
Payer: COMMERCIAL

## 2024-04-23 ENCOUNTER — APPOINTMENT (OUTPATIENT)
Dept: PSYCHIATRY | Facility: CLINIC | Age: 34
End: 2024-04-23

## 2024-04-23 DIAGNOSIS — F33.3 MAJOR DEPRESSIVE DISORDER, RECURRENT, SEVERE WITH PSYCHOTIC SYMPTOMS: ICD-10-CM

## 2024-04-23 DIAGNOSIS — F43.10 POST-TRAUMATIC STRESS DISORDER, UNSPECIFIED: ICD-10-CM

## 2024-04-23 DIAGNOSIS — F41.1 GENERALIZED ANXIETY DISORDER: ICD-10-CM

## 2024-04-23 DIAGNOSIS — F51.05 INSOMNIA DUE TO OTHER MENTAL DISORDER: ICD-10-CM

## 2024-04-23 DIAGNOSIS — F31.32 BIPOLAR DISORDER, CURRENT EPISODE DEPRESSED, MODERATE: ICD-10-CM

## 2024-04-23 PROCEDURE — 90832 PSYTX W PT 30 MINUTES: CPT

## 2024-04-23 NOTE — PHYSICAL EXAM
[Cooperative] : cooperative [Euthymic] : euthymic [Full] : full [Clear] : clear [Linear/Goal Directed] : linear/goal directed [Hallucinations - Auditory] : hallucinations - auditory [Average] : average [WNL] : within normal limits [FreeTextEntry8] : Improved [de-identified] : Pt is aware these are hallucinations and she reports she is able to see them as such

## 2024-04-23 NOTE — PHYSICAL EXAM
[Cooperative] : cooperative [Euthymic] : euthymic [Full] : full [Clear] : clear [Linear/Goal Directed] : linear/goal directed [Hallucinations - Auditory] : hallucinations - auditory [Average] : average [WNL] : within normal limits [FreeTextEntry8] : Improved [de-identified] : Pt is aware these are hallucinations and she reports she is able to see them as such

## 2024-04-23 NOTE — REASON FOR VISIT
[Patient preference] : as per patient preference [Continuing, patient not seen in-person within last 12 months (provide details below)] : Telehealth services are continuing, patient not seen in-person within last 12 months.  [Telehealth (audio & video) - Individual/Group] : This visit was provided via telehealth using real-time 2-way audio visual technology. [Other Location: e.g. Home (Enter Location, City,State)___] : The provider was located at [unfilled]. [Home] : The patient, [unfilled], was located at home, [unfilled], at the time of the visit. [Verbal consent obtained from patient/other participant(s)] : Verbal consent for telehealth/telephonic services obtained from patient/other participant(s) [FreeTextEntry4] : 10:15 am [FreeTextEntry5] : 10:45 pm [Patient] : Patient [FreeTextEntry1] : Psychotherapy follow up for mood stabilization.  Rosalio quinteros plan compose d with PT

## 2024-04-23 NOTE — PLAN
[FreeTextEntry2] :  Treatment Plan is composed with Patent  Goal:To manage my symptoms and decrease Psychotic symptoms Functioning remains excellent and medication ha s been increased Objective: practice CB and Mindfulness (Cue for self soothing is image of  fathers smile) [Skills training (all types)] : Skills training (all types)  [de-identified] : Treatment Plan is composed in session/ See plan and above entry [FreeTextEntry1] : PLAN: Next session scheduled for 5/7

## 2024-04-23 NOTE — PLAN
[FreeTextEntry2] :  Treatment Plan is composed with Patent  Goal:To manage my symptoms and decrease Psychotic symptoms Functioning remains excellent and medication ha s been increased Objective: practice CB and Mindfulness (Cue for self soothing is image of  fathers smile) [Skills training (all types)] : Skills training (all types)  [de-identified] : Treatment Plan is composed in session/ See plan and above entry [FreeTextEntry1] : PLAN: Next session scheduled for 5/7

## 2024-04-24 DIAGNOSIS — F33.3 MAJOR DEPRESSIVE DISORDER, RECURRENT, SEVERE WITH PSYCHOTIC SYMPTOMS: ICD-10-CM

## 2024-04-25 NOTE — DISCUSSION/SUMMARY
[Initial Plan] : Initial Plan [Able to manage surrounding demands and opportunities] : able to manage surrounding demands and opportunities [Able to exercise self-direction] : able to exercise self-direction [Able to set and pursue goals] : able to set and pursue goals [Adherent to treatment recommendations] : adherent to treatment recommendations [Articulate] : articulate [Attempting to realize their potential] : Attempting to realize their potential [Cognitively intact] : cognitively intact [Insightful] : insightful [Creative] : creative [Intelligent] : intelligent [Motivated to participate in treatment] : motivated to participate in treatment [Health literacy] : health literacy [Motivated to maintain or improve physical health] : motivated to maintain or improve physical health [Financially stable] : financially stable [Has vocational interests or hobbies] : has vocational interests or hobbies [Part of a supportive family] : part of a supportive family [Steady employment] : steady employment [Housing stability] : housing stability [Connected to healthcare] : connected to healthcare [Social supports] : social supports [Mental Health] : Mental Health [Initial] : Initial [every ___ weeks] : every [unfilled] weeks [Other rationale for transition/discharge:] : Other rationale for transition/discharge: [None - Reason others did not participate:] : None - Reason others did not participate:  [Yes] : Yes [Psychiatric Provider/Prescriber] : Psychiatric Provider/Prescriber [Potential impact of patient’s physical health conditions on psychiatric care?] : Potential impact of patient's physical health conditions on psychiatric care: No [FreeTextEntry1] : 4/23/24 [FreeTextEntry2] : 4/23/25 [FreeTextEntry6] : PT is doing well [FreeTextEntry7] : Family is supportive [FreeTextEntry8] : None seen [FreeTextEntry9] : Spiritual connections [de-identified] : psychiatrist Dr Jung and therapist, Olivia Vanegas [FreeTextEntry4] : My goal for therapy is to eliminate psychotic episodes and manage my symptoms [de-identified] : Use medication for eliminating of Psychotic symptoms [de-identified] : 4/23/25 [de-identified] : Use CB thought exercises for improvement of self concept [de-identified] : 4/23/25 [FreeTextEntry5] : Pt is responsive to session and to the interventions [de-identified] : Goals are met and PT is managing symptoms well [de-identified] : Not clinically indicated [de-identified] : Dr Jung , Psychiatrist and Olivia CUMMINSW therapist [Potential impact of patientÃ?Â?s physical health conditions on psychiatric care?] : Potential impact of patient's physical health conditions on psychiatric care: No [Does patient require any additional health services or referrals?] : Does patient require any additional health services or referrals: No

## 2024-04-25 NOTE — DISCUSSION/SUMMARY
[Initial Plan] : Initial Plan [Able to manage surrounding demands and opportunities] : able to manage surrounding demands and opportunities [Able to exercise self-direction] : able to exercise self-direction [Able to set and pursue goals] : able to set and pursue goals [Adherent to treatment recommendations] : adherent to treatment recommendations [Articulate] : articulate [Attempting to realize their potential] : Attempting to realize their potential [Cognitively intact] : cognitively intact [Insightful] : insightful [Creative] : creative [Intelligent] : intelligent [Motivated to participate in treatment] : motivated to participate in treatment [Health literacy] : health literacy [Motivated to maintain or improve physical health] : motivated to maintain or improve physical health [Financially stable] : financially stable [Has vocational interests or hobbies] : has vocational interests or hobbies [Part of a supportive family] : part of a supportive family [Steady employment] : steady employment [Housing stability] : housing stability [Connected to healthcare] : connected to healthcare [Social supports] : social supports [Mental Health] : Mental Health [Initial] : Initial [every ___ weeks] : every [unfilled] weeks [Other rationale for transition/discharge:] : Other rationale for transition/discharge: [None - Reason others did not participate:] : None - Reason others did not participate:  [Yes] : Yes [Psychiatric Provider/Prescriber] : Psychiatric Provider/Prescriber [Potential impact of patient’s physical health conditions on psychiatric care?] : Potential impact of patient's physical health conditions on psychiatric care: No [FreeTextEntry1] : 4/23/24 [FreeTextEntry2] : 4/23/25 [FreeTextEntry6] : PT is doing well [FreeTextEntry7] : Family is supportive [FreeTextEntry8] : None seen [FreeTextEntry9] : Spiritual connections [de-identified] : psychiatrist Dr Jung and therapist, Olivia Vnaegas [FreeTextEntry4] : My goal for therapy is to eliminate psychotic episodes and manage my symptoms [de-identified] : Use medication for eliminating of Psychotic symptoms [de-identified] : 4/23/25 [de-identified] : Use CB thought exercises for improvement of self concept [de-identified] : 4/23/25 [FreeTextEntry5] : Pt is responsive to session and to the interventions [de-identified] : Goals are met and PT is managing symptoms well [de-identified] : Not clinically indicated [de-identified] : Dr Jung , Psychiatrist and Olivia CUMMINSW therapist [Potential impact of patientÃ?Â?s physical health conditions on psychiatric care?] : Potential impact of patient's physical health conditions on psychiatric care: No [Does patient require any additional health services or referrals?] : Does patient require any additional health services or referrals: No

## 2024-05-07 ENCOUNTER — APPOINTMENT (OUTPATIENT)
Dept: PSYCHIATRY | Facility: CLINIC | Age: 34
End: 2024-05-07

## 2024-05-07 ENCOUNTER — OUTPATIENT (OUTPATIENT)
Dept: OUTPATIENT SERVICES | Facility: HOSPITAL | Age: 34
LOS: 1 days | End: 2024-05-07
Payer: COMMERCIAL

## 2024-05-07 DIAGNOSIS — F31.9 BIPOLAR DISORDER, UNSPECIFIED: ICD-10-CM

## 2024-05-07 DIAGNOSIS — F43.10 POST-TRAUMATIC STRESS DISORDER, UNSPECIFIED: ICD-10-CM

## 2024-05-07 PROCEDURE — 90834 PSYTX W PT 45 MINUTES: CPT

## 2024-05-07 NOTE — REASON FOR VISIT
[Patient preference] : as per patient preference [Continuing, patient not seen in-person within last 12 months (provide details below)] : Telehealth services are continuing, patient not seen in-person within last 12 months.  [Telehealth (audio & video) - Individual/Group] : This visit was provided via telehealth using real-time 2-way audio visual technology. [Other Location: e.g. Home (Enter Location, City,State)___] : The provider was located at [unfilled]. [Verbal consent obtained from patient/other participant(s)] : Verbal consent for telehealth/telephonic services obtained from patient/other participant(s) [FreeTextEntry4] : 10 am [FreeTextEntry5] : 10:40 am [Patient] : Patient [FreeTextEntry1] : Psychotherapy for Mood stabilization

## 2024-05-07 NOTE — PLAN
[FreeTextEntry2] :  Treatment Plan is composed with Patent at last session Goal:To manage my symptoms and decrease Psychotic symptoms Functioning remains excellent and medication ha s been increased Objective: practice CB and Mindfulness (Cue for self soothing is image of  fathers smile) [Skills training (all types)] : Skills training (all types)  [de-identified] :  Bisi is able to express her difficulty with the "Paranoid voices" She is managing them and functioning at work at FT job with no distraction from "voices" Bisi is keeping all Psychiatric sessions and Mood stabilizer is currently being adjusted Until the medication is at therapeutic level Bisi is responsive to CB imagery and exercise is done oin session. CB and mindfulness are helpful and pt understands the concept and will practice to refocus negative thoughts In session she is alert, focused and thoughts are linear and positive [FreeTextEntry1] : Next session scheduled for 5/21

## 2024-05-07 NOTE — PHYSICAL EXAM
[Cooperative] : cooperative [Euthymic] : euthymic [Full] : full [Clear] : clear [Linear/Goal Directed] : linear/goal directed [Hallucinations - Auditory] : hallucinations - auditory [Average] : average [WNL] : within normal limits [FreeTextEntry8] : Improved [de-identified] : Pt is aware these are hallucinations and she reports she is able to see them as such

## 2024-05-08 DIAGNOSIS — F43.10 POST-TRAUMATIC STRESS DISORDER, UNSPECIFIED: ICD-10-CM

## 2024-05-13 RX ORDER — OLANZAPINE 7.5 MG/1
7.5 TABLET, FILM COATED ORAL
Qty: 30 | Refills: 0 | Status: COMPLETED | COMMUNITY
Start: 2022-09-28 | End: 2024-05-13

## 2024-05-13 NOTE — DISCUSSION/SUMMARY
[FreeTextEntry1] : Pt reports worsening paranoia and continues to hear voices that tell negative things to pt. Pt denies any CAH however. PT denies any active or passive Si/HI at this time. Pt continues to endorse initial and middle insomnia on some nights. Will increase Olanzapine to 10mg qhs at bedtime and follow up next week. Pt in agreement.

## 2024-05-20 ENCOUNTER — OUTPATIENT (OUTPATIENT)
Dept: OUTPATIENT SERVICES | Facility: HOSPITAL | Age: 34
LOS: 1 days | End: 2024-05-20
Payer: COMMERCIAL

## 2024-05-20 ENCOUNTER — APPOINTMENT (OUTPATIENT)
Dept: PSYCHIATRY | Facility: CLINIC | Age: 34
End: 2024-05-20
Payer: COMMERCIAL

## 2024-05-20 DIAGNOSIS — F43.10 POST-TRAUMATIC STRESS DISORDER, UNSPECIFIED: ICD-10-CM

## 2024-05-20 DIAGNOSIS — F51.02 ADJUSTMENT INSOMNIA: ICD-10-CM

## 2024-05-20 DIAGNOSIS — F31.32 BIPOLAR DISORDER, CURRENT EPISODE DEPRESSED, MODERATE: ICD-10-CM

## 2024-05-20 DIAGNOSIS — F51.05 INSOMNIA DUE TO OTHER MENTAL DISORDER: ICD-10-CM

## 2024-05-20 DIAGNOSIS — F41.1 GENERALIZED ANXIETY DISORDER: ICD-10-CM

## 2024-05-20 PROCEDURE — 99214 OFFICE O/P EST MOD 30 MIN: CPT | Mod: 95

## 2024-05-20 NOTE — FAMILY HISTORY
[FreeTextEntry1] : Family composition: Lives alone in a studio apartment. Never  , no kids. \par  Family history and background: Born and raised in [place]. Father passed away when she was a little girl, mother is alive. Older brother 47 y/o, and her sister is 49 y/o. \par  Family relationship: Good with mother, close relationship with her siblings.  \par  Pertinent Family Medical, MH and Substance Use History including Adult Child of Alcoholic and child of substance abuse status; history of cancer and heart disease:\par  \par  Father: passed away due to a car accident years ago. \par  Mother 67 y/o: cholesterol, diabetes. \par  \par  \par
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
## 2024-05-20 NOTE — PHYSICAL EXAM
[Average] : average [Intermittent] : intermittent [Slowed] : slowed [Depressed] : depressed [Constricted] : constricted [Clear] : clear [Linear/Goal Directed] : linear/goal directed [WNL] : within normal limits [None] : none [None Reported] : none reported [Fund of knowledge] : fund of knowledge [Moderate] : moderate [FreeTextEntry5] : less guarded [de-identified] : denies [de-identified] : poor

## 2024-05-20 NOTE — DISCUSSION/SUMMARY
[FreeTextEntry1] : Pt reports paranoia and ongoing AH. Will increase Olanzapine to aid with AH. Pt in agreement.   Diff Dx: Bipolar I disorder current episode depressed, moderate. PTSD, SHAI with panic attacks, Personality Disorder NOS  PLAN  1. Next appt in 1 month 2. Continue Prozac  20mg for depression 3. Increase Olanzapine to 15mg qd for mood stabilization 4. continue Lorazepam 0.5mg qd PRN for anxiety/panic 5. Continue therapy 6. Pt to send copy of recently completed bloodwork done last week to provider
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
## 2024-05-21 ENCOUNTER — APPOINTMENT (OUTPATIENT)
Dept: PSYCHIATRY | Facility: CLINIC | Age: 34
End: 2024-05-21

## 2024-05-21 ENCOUNTER — OUTPATIENT (OUTPATIENT)
Dept: OUTPATIENT SERVICES | Facility: HOSPITAL | Age: 34
LOS: 1 days | End: 2024-05-21
Payer: COMMERCIAL

## 2024-05-21 DIAGNOSIS — F51.05 INSOMNIA DUE TO OTHER MENTAL DISORDER: ICD-10-CM

## 2024-05-21 DIAGNOSIS — F43.10 POST-TRAUMATIC STRESS DISORDER, UNSPECIFIED: ICD-10-CM

## 2024-05-21 DIAGNOSIS — F41.1 GENERALIZED ANXIETY DISORDER: ICD-10-CM

## 2024-05-21 DIAGNOSIS — F31.32 BIPOLAR DISORDER, CURRENT EPISODE DEPRESSED, MODERATE: ICD-10-CM

## 2024-05-21 PROCEDURE — 90832 PSYTX W PT 30 MINUTES: CPT

## 2024-05-21 NOTE — RISK ASSESSMENT
[No, patient denies ideation or behavior] : No, patient denies ideation or behavior [FreeTextEntry7] : No risk to self or others elicited [FreeTextEntry9] : No risk to self or others elicited [Low acute suicide risk] : Low acute suicide risk

## 2024-05-21 NOTE — PLAN
[FreeTextEntry2] :  Treatment Plan is composed with Patent at  session of 2024 Goal:To manage my symptoms and decrease Psychotic symptoms Functioning remains excellent and medication ha s been increased Objective: practice CB and Mindfulness (Cue for self soothing is image of  fathers smile) [Skills training (all types)] : Skills training (all types)  [Supportive Therapy] : Supportive Therapy [de-identified] : In session Bisi is alert, focused and thoughts are linear and positive. she states her medication ha s been up titrated and she is more able to manage the hallucinations which have "begun to fade." Bisi king s been walking daily and tracking her food on a wellness [employer]. Bisi is seriously concentrating on health improvement both physically, emotionally and spiritually. We reviewed coping skills as well as how to reframe some negative self talk.  Bisi is responsive to session interventions  [FreeTextEntry1] : PLAN: Next session in 2 weeks, 6/4

## 2024-05-21 NOTE — REASON FOR VISIT
[Continuing, patient not seen in-person within last 12 months (provide details below)] : Telehealth services are continuing, patient not seen in-person within last 12 months.  [Telehealth (audio & video) - Individual/Group] : This visit was provided via telehealth using real-time 2-way audio visual technology. [Other Location: e.g. Home (Enter Location, City,State)___] : The provider was located at [unfilled]. [Home] : The patient, [unfilled], was located at home, [unfilled], at the time of the visit. [Verbal consent obtained from patient/other participant(s)] : Verbal consent for telehealth/telephonic services obtained from patient/other participant(s) [FreeTextEntry4] : 11 :30 am [FreeTextEntry5] : 12 pm [Patient] : Patient [FreeTextEntry1] : Psychotherapy follow up for depression with psychotic features

## 2024-05-22 DIAGNOSIS — F43.10 POST-TRAUMATIC STRESS DISORDER, UNSPECIFIED: ICD-10-CM

## 2024-06-04 ENCOUNTER — OUTPATIENT (OUTPATIENT)
Dept: OUTPATIENT SERVICES | Facility: HOSPITAL | Age: 34
LOS: 1 days | End: 2024-06-04
Payer: COMMERCIAL

## 2024-06-04 ENCOUNTER — APPOINTMENT (OUTPATIENT)
Dept: PSYCHIATRY | Facility: CLINIC | Age: 34
End: 2024-06-04

## 2024-06-04 DIAGNOSIS — F31.9 BIPOLAR DISORDER, UNSPECIFIED: ICD-10-CM

## 2024-06-04 PROCEDURE — 90832 PSYTX W PT 30 MINUTES: CPT

## 2024-06-04 NOTE — PHYSICAL EXAM
[Cooperative] : cooperative [Euthymic] : euthymic [Full] : full [Clear] : clear [Linear/Goal Directed] : linear/goal directed [Average] : average [WNL] : within normal limits [FreeTextEntry8] : Improved [de-identified] : No Psychotic symptoms are reported

## 2024-06-04 NOTE — PLAN
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 Bisi is responsive to session interventions [FreeTextEntry1] : PLAN: Next session scheduled for 6/18

## 2024-06-04 NOTE — REASON FOR VISIT
[Patient preference] : as per patient preference [Continuing, patient not seen in-person within last 12 months (provide details below)] : Telehealth services are continuing, patient not seen in-person within last 12 months.  [Telehealth (audio & video) - Individual/Group] : This visit was provided via telehealth using real-time 2-way audio visual technology. [Other Location: e.g. Home (Enter Location, City,State)___] : The provider was located at [unfilled]. [Home] : The patient, [unfilled], was located at home, [unfilled], at the time of the visit. [Verbal consent obtained from patient/other participant(s)] : Verbal consent for telehealth/telephonic services obtained from patient/other participant(s) [FreeTextEntry4] : 10:15 am [FreeTextEntry5] : 10:45 am [Patient] : Patient [FreeTextEntry1] : Psychotherapy follow up for mood regulation and improve communication skills

## 2024-06-05 DIAGNOSIS — F31.9 BIPOLAR DISORDER, UNSPECIFIED: ICD-10-CM

## 2024-06-06 ENCOUNTER — OUTPATIENT (OUTPATIENT)
Dept: OUTPATIENT SERVICES | Facility: HOSPITAL | Age: 34
LOS: 1 days | End: 2024-06-06
Payer: COMMERCIAL

## 2024-06-06 ENCOUNTER — APPOINTMENT (OUTPATIENT)
Dept: PSYCHIATRY | Facility: CLINIC | Age: 34
End: 2024-06-06
Payer: COMMERCIAL

## 2024-06-06 DIAGNOSIS — F41.1 GENERALIZED ANXIETY DISORDER: ICD-10-CM

## 2024-06-06 DIAGNOSIS — F43.10 POST-TRAUMATIC STRESS DISORDER, UNSPECIFIED: ICD-10-CM

## 2024-06-06 DIAGNOSIS — F51.05 INSOMNIA DUE TO OTHER MENTAL DISORDER: ICD-10-CM

## 2024-06-06 DIAGNOSIS — F31.32 BIPOLAR DISORDER, CURRENT EPISODE DEPRESSED, MODERATE: ICD-10-CM

## 2024-06-06 PROCEDURE — 99213 OFFICE O/P EST LOW 20 MIN: CPT | Mod: 95

## 2024-06-06 RX ORDER — LORAZEPAM 0.5 MG/1
0.5 TABLET ORAL
Qty: 30 | Refills: 0 | Status: ACTIVE | COMMUNITY
Start: 2023-04-06 | End: 1900-01-01

## 2024-06-06 RX ORDER — FLUOXETINE HYDROCHLORIDE 20 MG/1
20 CAPSULE ORAL
Qty: 30 | Refills: 2 | Status: ACTIVE | COMMUNITY
Start: 2022-09-28 | End: 1900-01-01

## 2024-06-06 RX ORDER — OLANZAPINE 15 MG/1
15 TABLET, FILM COATED ORAL
Qty: 30 | Refills: 2 | Status: ACTIVE | COMMUNITY
Start: 2024-05-13 | End: 1900-01-01

## 2024-06-06 NOTE — DISCUSSION/SUMMARY
[FreeTextEntry1] : Pt reports improvement in paranoia and AH since increasing Olanzapine. Will continue current med regimen at this time due to ongoing benefit.   Diff Dx: Bipolar I disorder current episode depressed, moderate. PTSD, SHAI with panic attacks, Personality Disorder NOS  PLAN  1. Next appt in 3 month 2. Continue Prozac  20mg for depression 3. continue Olanzapine 15mg qd for mood stabilization 4. continue Lorazepam 0.5mg qd PRN for anxiety/panic. Pt agrees to contact covering physician to request refill when due until provider returns to office. Next appointment 8/29 at 11AM 5. Continue therapy

## 2024-06-06 NOTE — PHYSICAL EXAM
[Average] : average [Intermittent] : intermittent [Slowed] : slowed [Depressed] : depressed [Constricted] : constricted [Clear] : clear [Linear/Goal Directed] : linear/goal directed [WNL] : within normal limits [None] : none [None Reported] : none reported [Fund of knowledge] : fund of knowledge [Moderate] : moderate [FreeTextEntry5] : less guarded [de-identified] : denies [de-identified] : poor
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
## 2024-06-06 NOTE — FAMILY HISTORY
[FreeTextEntry1] : Family composition: Lives alone in a studio apartment. Never  , no kids. \par  Family history and background: Born and raised in [place]. Father passed away when she was a little girl, mother is alive. Older brother 45 y/o, and her sister is 49 y/o. \par  Family relationship: Good with mother, close relationship with her siblings.  \par  Pertinent Family Medical, MH and Substance Use History including Adult Child of Alcoholic and child of substance abuse status; history of cancer and heart disease:\par  \par  Father: passed away due to a car accident years ago. \par  Mother 67 y/o: cholesterol, diabetes. \par  \par  \par

## 2024-06-07 DIAGNOSIS — F31.32 BIPOLAR DISORDER, CURRENT EPISODE DEPRESSED, MODERATE: ICD-10-CM

## 2024-06-07 DIAGNOSIS — F41.1 GENERALIZED ANXIETY DISORDER: ICD-10-CM

## 2024-06-07 DIAGNOSIS — F43.10 POST-TRAUMATIC STRESS DISORDER, UNSPECIFIED: ICD-10-CM

## 2024-06-07 DIAGNOSIS — F51.05 INSOMNIA DUE TO OTHER MENTAL DISORDER: ICD-10-CM

## 2024-06-18 ENCOUNTER — OUTPATIENT (OUTPATIENT)
Dept: OUTPATIENT SERVICES | Facility: HOSPITAL | Age: 34
LOS: 1 days | End: 2024-06-18
Payer: COMMERCIAL

## 2024-06-18 ENCOUNTER — APPOINTMENT (OUTPATIENT)
Dept: PSYCHIATRY | Facility: CLINIC | Age: 34
End: 2024-06-18

## 2024-06-18 DIAGNOSIS — F31.9 BIPOLAR DISORDER, UNSPECIFIED: ICD-10-CM

## 2024-06-18 PROCEDURE — 90832 PSYTX W PT 30 MINUTES: CPT

## 2024-06-18 NOTE — REASON FOR VISIT
Pt's mother Judy Villar notified of pt's transfer to [address], per pt request.   [Patient preference] : as per patient preference [Continuing, patient not seen in-person within last 12 months (provide details below)] : Telehealth services are continuing, patient not seen in-person within last 12 months.  [Telehealth (audio & video) - Individual/Group] : This visit was provided via telehealth using real-time 2-way audio visual technology. [Other Location: e.g. Home (Enter Location, City,State)___] : The provider was located at [unfilled]. [Home] : The patient, [unfilled], was located at home, [unfilled], at the time of the visit. [Verbal consent obtained from patient/other participant(s)] : Verbal consent for telehealth/telephonic services obtained from patient/other participant(s) [FreeTextEntry4] : 10:30 am [FreeTextEntry5] : 10::50 am [Patient] : Patient [FreeTextEntry1] : Psychotherapy for mood stabilization

## 2024-06-18 NOTE — PLAN
[FreeTextEntry2] :  Treatment Plan is composed with Patent at  session of April 23, 2024  Goal: Improve communication skills [Skills training (all types)] : Skills training (all types)  [Supportive Therapy] : Supportive Therapy [de-identified] : In session Bisi is  alert, oriented and reports remission of psychotic symptoms Bisi  is improving in communication skills and also has improved in some of her goals/objectives. She has attended [employer] with GF and also has joined [facility] and participated in group. She applied for volunteer work there also. Bisi continues with positive mood and excellent functioning Next session is scheduled for 2 weeks and Pt is encouraged to call if any changes or concerns  [FreeTextEntry1] : PLAN: Next session scheduled for July 2

## 2024-06-18 NOTE — PHYSICAL EXAM
[Cooperative] : cooperative [Euthymic] : euthymic [Full] : full [Clear] : clear [Linear/Goal Directed] : linear/goal directed [Average] : average [WNL] : within normal limits [FreeTextEntry8] : Improved [de-identified] :  Stable with normal mental status

## 2024-06-19 DIAGNOSIS — F31.9 BIPOLAR DISORDER, UNSPECIFIED: ICD-10-CM

## 2024-07-02 ENCOUNTER — APPOINTMENT (OUTPATIENT)
Dept: PSYCHIATRY | Facility: CLINIC | Age: 34
End: 2024-07-02

## 2024-07-02 ENCOUNTER — OUTPATIENT (OUTPATIENT)
Dept: OUTPATIENT SERVICES | Facility: HOSPITAL | Age: 34
LOS: 1 days | End: 2024-07-02
Payer: COMMERCIAL

## 2024-07-02 DIAGNOSIS — F31.9 BIPOLAR DISORDER, UNSPECIFIED: ICD-10-CM

## 2024-07-02 PROCEDURE — 90832 PSYTX W PT 30 MINUTES: CPT

## 2024-07-03 DIAGNOSIS — F31.9 BIPOLAR DISORDER, UNSPECIFIED: ICD-10-CM

## 2024-07-23 ENCOUNTER — NON-APPOINTMENT (OUTPATIENT)
Age: 34
End: 2024-07-23

## 2024-07-23 ENCOUNTER — APPOINTMENT (OUTPATIENT)
Dept: PSYCHIATRY | Facility: CLINIC | Age: 34
End: 2024-07-23

## 2024-07-24 ENCOUNTER — APPOINTMENT (OUTPATIENT)
Dept: PSYCHIATRY | Facility: CLINIC | Age: 34
End: 2024-07-24

## 2024-07-24 ENCOUNTER — NON-APPOINTMENT (OUTPATIENT)
Age: 34
End: 2024-07-24

## 2024-07-24 NOTE — REASON FOR VISIT
[Patient preference] : as per patient preference [Continuing, patient not seen in-person within last 12 months (provide details below)] : Telehealth services are continuing, patient not seen in-person within last 12 months.  [Telehealth (audio & video) - Individual/Group] : This visit was provided via telehealth using real-time 2-way audio visual technology. [Medical Office: ([facility])___] : The provider was located at the medical office in [unfilled]. [Home] : The patient, [unfilled], was located at home, [unfilled], at the time of the visit. [Verbal consent obtained from patient/other participant(s)] : Verbal consent for telehealth/telephonic services obtained from patient/other participant(s) [FreeTextEntry4] : 11 am [FreeTextEntry5] : 11:30 am [Patient] : Patient [FreeTextEntry1] : Psychotherapy follow up for mood regulation

## 2024-07-24 NOTE — PHYSICAL EXAM
[Cooperative] : cooperative [Anxious] : anxious [Constricted] : constricted [Clear] : clear [Linear/Goal Directed] : linear/goal directed [Average] : average [WNL] : within normal limits
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
## 2024-08-06 ENCOUNTER — OUTPATIENT (OUTPATIENT)
Dept: OUTPATIENT SERVICES | Facility: HOSPITAL | Age: 34
LOS: 1 days | End: 2024-08-06
Payer: COMMERCIAL

## 2024-08-06 ENCOUNTER — APPOINTMENT (OUTPATIENT)
Dept: PSYCHIATRY | Facility: CLINIC | Age: 34
End: 2024-08-06

## 2024-08-06 DIAGNOSIS — F31.9 BIPOLAR DISORDER, UNSPECIFIED: ICD-10-CM

## 2024-08-06 PROCEDURE — 90832 PSYTX W PT 30 MINUTES: CPT

## 2024-08-06 NOTE — REASON FOR VISIT
[Patient preference] : as per patient preference [Continuing, patient not seen in-person within last 12 months (provide details below)] : Telehealth services are continuing, patient not seen in-person within last 12 months.  [Telehealth (audio & video) - Individual/Group] : This visit was provided via telehealth using real-time 2-way audio visual technology. [Other Location: e.g. Home (Enter Location, City,State)___] : The provider was located at [unfilled]. [Home] : The patient, [unfilled], was located at home, [unfilled], at the time of the visit. [Verbal consent obtained from patient/other participant(s)] : Verbal consent for telehealth/telephonic services obtained from patient/other participant(s) [FreeTextEntry4] : 10:15 am [FreeTextEntry5] : 10:40 am [Patient] : Patient [FreeTextEntry1] : Psychotherapy follow up for anxiety and mood stability

## 2024-08-06 NOTE — PLAN
[FreeTextEntry2] :  Treatment Plan is composed with Patent at  session of April 23, 2024  Goal: Improve communication skills New problem: Need to heal past issues especially regarding relationship with Mother [Skills training (all types)] : Skills training (all types)  [Supportive Therapy] : Supportive Therapy [de-identified] : Bisi endorses improvement in mood and remission of  symptoms at this time.She reports she has gotten back on track with her medication and taking it as prescribed. Pt reports remission of psychotic symptoms and is alert and interactive in session. Bisi appears to have been using coping skills appropriately and is responsive to interventions Bisi is encouraged to call if any changes or concerns prior to next session   [FreeTextEntry1] : Plan: In view of Therapists vacation, next session scheduled for 3 weeks , 9/2

## 2024-08-07 DIAGNOSIS — F31.9 BIPOLAR DISORDER, UNSPECIFIED: ICD-10-CM

## 2024-08-29 ENCOUNTER — OUTPATIENT (OUTPATIENT)
Dept: OUTPATIENT SERVICES | Facility: HOSPITAL | Age: 34
LOS: 1 days | End: 2024-08-29
Payer: COMMERCIAL

## 2024-08-29 ENCOUNTER — APPOINTMENT (OUTPATIENT)
Dept: PSYCHIATRY | Facility: CLINIC | Age: 34
End: 2024-08-29
Payer: COMMERCIAL

## 2024-08-29 DIAGNOSIS — F43.10 POST-TRAUMATIC STRESS DISORDER, UNSPECIFIED: ICD-10-CM

## 2024-08-29 DIAGNOSIS — F51.05 INSOMNIA DUE TO OTHER MENTAL DISORDER: ICD-10-CM

## 2024-08-29 DIAGNOSIS — F41.1 GENERALIZED ANXIETY DISORDER: ICD-10-CM

## 2024-08-29 DIAGNOSIS — F12.90 CANNABIS USE, UNSPECIFIED, UNCOMPLICATED: ICD-10-CM

## 2024-08-29 DIAGNOSIS — F31.32 BIPOLAR DISORDER, CURRENT EPISODE DEPRESSED, MODERATE: ICD-10-CM

## 2024-08-29 PROCEDURE — 99214 OFFICE O/P EST MOD 30 MIN: CPT | Mod: 95
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
## 2024-08-29 NOTE — PHYSICAL EXAM
[Average] : average [Intermittent] : intermittent [Slowed] : slowed [Depressed] : depressed [Constricted] : constricted [Clear] : clear [Linear/Goal Directed] : linear/goal directed [WNL] : within normal limits [None] : none [None Reported] : none reported [Fund of knowledge] : fund of knowledge [Moderate] : moderate [FreeTextEntry5] : less guarded [de-identified] : denies [de-identified] : poor
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
## 2024-08-29 NOTE — DISCUSSION/SUMMARY
[FreeTextEntry1] : Pt endorses possible intermittent AH, unclear at this time if it is true AH or pt's internal monologue. Pt agrees to monitor symptoms and continue current med regimen and will discuss potential adjustments at next if symptoms persist or worsen.   Diff Dx: Bipolar I disorder current episode depressed, moderate. PTSD, SHAI with panic attacks, Personality Disorder NOS  PLAN  1. Next appt in 1 month 2. Continue Prozac  20mg for depression 3. continue Olanzapine 15mg qd for mood stabilization 4. continue Lorazepam 0.5mg qd PRN for anxiety/panic.  5. Continue therapy

## 2024-08-29 NOTE — FAMILY HISTORY
[FreeTextEntry1] : Family composition: Lives alone in a studio apartment. Never  , no kids. \par  Family history and background: Born and raised in [place]. Father passed away when she was a little girl, mother is alive. Older brother 45 y/o, and her sister is 49 y/o. \par  Family relationship: Good with mother, close relationship with her siblings.  \par  Pertinent Family Medical, MH and Substance Use History including Adult Child of Alcoholic and child of substance abuse status; history of cancer and heart disease:\par  \par  Father: passed away due to a car accident years ago. \par  Mother 65 y/o: cholesterol, diabetes. \par  \par  \par

## 2024-08-30 DIAGNOSIS — F43.10 POST-TRAUMATIC STRESS DISORDER, UNSPECIFIED: ICD-10-CM

## 2024-08-30 DIAGNOSIS — F31.32 BIPOLAR DISORDER, CURRENT EPISODE DEPRESSED, MODERATE: ICD-10-CM

## 2024-08-30 DIAGNOSIS — F41.1 GENERALIZED ANXIETY DISORDER: ICD-10-CM

## 2024-09-03 ENCOUNTER — OUTPATIENT (OUTPATIENT)
Dept: OUTPATIENT SERVICES | Facility: HOSPITAL | Age: 34
LOS: 1 days | End: 2024-09-03
Payer: COMMERCIAL

## 2024-09-03 ENCOUNTER — APPOINTMENT (OUTPATIENT)
Dept: PSYCHIATRY | Facility: CLINIC | Age: 34
End: 2024-09-03

## 2024-09-03 DIAGNOSIS — F31.9 BIPOLAR DISORDER, UNSPECIFIED: ICD-10-CM

## 2024-09-03 PROCEDURE — 90832 PSYTX W PT 30 MINUTES: CPT
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
## 2024-09-03 NOTE — REASON FOR VISIT
[Patient preference] : as per patient preference [Continuing, patient not seen in-person within last 12 months (provide details below)] : Telehealth services are continuing, patient not seen in-person within last 12 months.  [Telehealth (audio & video) - Individual/Group] : This visit was provided via telehealth using real-time 2-way audio visual technology. [Other Location: e.g. Home (Enter Location, City,State)___] : The provider was located at [unfilled]. [Home] : The patient, [unfilled], was located at home, [unfilled], at the time of the visit. [Verbal consent obtained from patient/other participant(s)] : Verbal consent for telehealth/telephonic services obtained from patient/other participant(s) [FreeTextEntry4] : 10:15 am [FreeTextEntry5] : 10:45 am [Patient] : Patient [FreeTextEntry1] : Psychotherapy follow up for PTSD an d Mood dys regulation

## 2024-09-04 DIAGNOSIS — F31.9 BIPOLAR DISORDER, UNSPECIFIED: ICD-10-CM

## 2024-09-10 ENCOUNTER — OUTPATIENT (OUTPATIENT)
Dept: OUTPATIENT SERVICES | Facility: HOSPITAL | Age: 34
LOS: 1 days | End: 2024-09-10
Payer: COMMERCIAL

## 2024-09-10 ENCOUNTER — APPOINTMENT (OUTPATIENT)
Dept: PSYCHIATRY | Facility: CLINIC | Age: 34
End: 2024-09-10

## 2024-09-10 DIAGNOSIS — F43.10 POST-TRAUMATIC STRESS DISORDER, UNSPECIFIED: ICD-10-CM

## 2024-09-10 DIAGNOSIS — F31.9 BIPOLAR DISORDER, UNSPECIFIED: ICD-10-CM

## 2024-09-10 PROCEDURE — 90832 PSYTX W PT 30 MINUTES: CPT

## 2024-09-10 NOTE — REASON FOR VISIT
[Patient preference] : as per patient preference [Continuing, patient not seen in-person within last 12 months (provide details below)] : Telehealth services are continuing, patient not seen in-person within last 12 months.  [Telehealth (audio & video) - Individual/Group] : This visit was provided via telehealth using real-time 2-way audio visual technology. [Other Location: e.g. Home (Enter Location, City,State)___] : The provider was located at [unfilled]. [Home] : The patient, [unfilled], was located at home, [unfilled], at the time of the visit. [Patient's space is appropriate for telehealth and maintains privacy/confidentiality.] : Patient's space is appropriate for telehealth and maintains privacy/confidentiality. [Participant(s) identity verified] : Participant(s) identity verified. [FreeTextEntry4] : 9:35 am [FreeTextEntry5] : 9:55 pm [Patient] : Patient [FreeTextEntry1] : Psychotherapy follow up for PTSD and mood stabilization

## 2024-09-10 NOTE — PLAN
[FreeTextEntry2] :  Treatment Plan is composed with Patent at  session of April 23, 2024  Goal: Improve communication skills New problem: Need to heal past issues especially regarding relationship with Mother [Skills training (all types)] : Skills training (all types)  [Supportive Therapy] : Supportive Therapy [Trauma Focused CBT] : Trauma Focused CBT [de-identified] :   "I feel Blah."  Bisi states her mood has improved and functioning remains good. 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 She has contacted her  and will be using the help offered to her Although this is causing anxiety, she has hope that she will be able to keep her License which will assure her of continuing in her job In session Bisi presents with euthymic mood and is responsive and interactive [FreeTextEntry1] : PLAN: Next session scheduled for 9/17

## 2024-09-10 NOTE — END OF VISIT
[facility] [place]  [address]  [address] [address]  [phone] phone     [phone] fax  Hours of Operation: Monday- Friday 8:00am- 5:00pm  After hours phone  [phone]  Hematology / Oncology Physicians on call      CARLIE Koenig Dr., Dr., Dr., Dr., NP Cheree Bodden, NP Sydney Prescott, NP      Please call with any concerns regarding your appointment today.       [Individual Psychotherapy for 16-37 minutes] : Individual Psychotherapy for 16-37 minutes

## 2024-09-11 DIAGNOSIS — F43.10 POST-TRAUMATIC STRESS DISORDER, UNSPECIFIED: ICD-10-CM

## 2024-09-17 ENCOUNTER — OUTPATIENT (OUTPATIENT)
Dept: OUTPATIENT SERVICES | Facility: HOSPITAL | Age: 34
LOS: 1 days | End: 2024-09-17
Payer: COMMERCIAL

## 2024-09-17 ENCOUNTER — APPOINTMENT (OUTPATIENT)
Dept: PSYCHIATRY | Facility: CLINIC | Age: 34
End: 2024-09-17

## 2024-09-17 DIAGNOSIS — F43.10 POST-TRAUMATIC STRESS DISORDER, UNSPECIFIED: ICD-10-CM

## 2024-09-17 PROCEDURE — 90832 PSYTX W PT 30 MINUTES: CPT

## 2024-09-17 NOTE — REASON FOR VISIT
[Patient preference] : as per patient preference [Continuing, patient not seen in-person within last 12 months (provide details below)] : Telehealth services are continuing, patient not seen in-person within last 12 months.  [Telehealth (audio & video) - Individual/Group] : This visit was provided via telehealth using real-time 2-way audio visual technology. [Other Location: e.g. Home (Enter Location, City,State)___] : The provider was located at [unfilled]. [Patient's space is appropriate for telehealth and maintains privacy/confidentiality.] : Patient's space is appropriate for telehealth and maintains privacy/confidentiality. [Participant(s) identity verified] : Participant(s) identity verified. [FreeTextEntry4] : 10:15 am [FreeTextEntry5] : 10:45 am [Patient] : Patient [FreeTextEntry1] : Psychotherapy for mood stabilization

## 2024-09-17 NOTE — PLAN
[FreeTextEntry2] :  Treatment Plan is composed with Patent at  session of April 23, 2024  Goal: Improve communication skills  Continued: Need to heal past issues especially regarding repairing and healing the past through healthy behaviors [Skills training (all types)] : Skills training (all types)  [Supportive Therapy] : Supportive Therapy [de-identified] :     Bisi states her mood has improved and functioning remains good.  She has now hired an employment  who is working with her to help repair  her past .(rap sheet ). 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 In session she is alert, focused and interactive   [FreeTextEntry1] : PLAN: Next session scheduled for 2 weeks, 10/1

## 2024-09-17 NOTE — PHYSICAL EXAM
[Cooperative] : cooperative [Euthymic] : euthymic [Constricted] : constricted [Clear] : clear [Linear/Goal Directed] : linear/goal directed [Average] : average [WNL] : within normal limits [FreeTextEntry8] : Improved [de-identified] : Mood is improved and pt endorses less anxiety

## 2024-09-18 DIAGNOSIS — F43.10 POST-TRAUMATIC STRESS DISORDER, UNSPECIFIED: ICD-10-CM

## 2024-09-26 ENCOUNTER — APPOINTMENT (OUTPATIENT)
Dept: PSYCHIATRY | Facility: CLINIC | Age: 34
End: 2024-09-26
Payer: COMMERCIAL

## 2024-09-26 ENCOUNTER — OUTPATIENT (OUTPATIENT)
Dept: OUTPATIENT SERVICES | Facility: HOSPITAL | Age: 34
LOS: 1 days | End: 2024-09-26
Payer: COMMERCIAL

## 2024-09-26 DIAGNOSIS — F41.1 GENERALIZED ANXIETY DISORDER: ICD-10-CM

## 2024-09-26 DIAGNOSIS — F43.10 POST-TRAUMATIC STRESS DISORDER, UNSPECIFIED: ICD-10-CM

## 2024-09-26 DIAGNOSIS — F31.32 BIPOLAR DISORDER, CURRENT EPISODE DEPRESSED, MODERATE: ICD-10-CM

## 2024-09-26 PROCEDURE — 99213 OFFICE O/P EST LOW 20 MIN: CPT | Mod: 95

## 2024-09-26 NOTE — FAMILY HISTORY
[FreeTextEntry1] : Family composition: Lives alone in a studio apartment. Never  , no kids. \par  Family history and background: Born and raised in [place]. Father passed away when she was a little girl, mother is alive. Older brother 45 y/o, and her sister is 51 y/o. \par  Family relationship: Good with mother, close relationship with her siblings.  \par  Pertinent Family Medical, MH and Substance Use History including Adult Child of Alcoholic and child of substance abuse status; history of cancer and heart disease:\par  \par  Father: passed away due to a car accident years ago. \par  Mother 65 y/o: cholesterol, diabetes. \par  \par  \par

## 2024-09-26 NOTE — DISCUSSION/SUMMARY
[FreeTextEntry1] : Pt requests to remain on current med regimen due to benefit. Pt informed of provider's upcoming departure from clinic.   Diff Dx: Bipolar I disorder current episode depressed, moderate. PTSD, SHAI with panic attacks, Personality Disorder NOS  PLAN  1. Next appt in 1 month 2. Continue Prozac  20mg for depression 3. continue Olanzapine 15mg qd for mood stabilization 4. continue Lorazepam 0.5mg qd PRN for anxiety/panic.  5. Continue therapy

## 2024-09-26 NOTE — PHYSICAL EXAM
[Average] : average [Intermittent] : intermittent [Slowed] : slowed [Depressed] : depressed [Constricted] : constricted [Clear] : clear [Linear/Goal Directed] : linear/goal directed [WNL] : within normal limits [None] : none [None Reported] : none reported [Fund of knowledge] : fund of knowledge [Moderate] : moderate [FreeTextEntry5] : less guarded [de-identified] : denies [de-identified] : poor
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
## 2024-09-27 DIAGNOSIS — F41.1 GENERALIZED ANXIETY DISORDER: ICD-10-CM

## 2024-10-01 ENCOUNTER — APPOINTMENT (OUTPATIENT)
Dept: PSYCHIATRY | Facility: CLINIC | Age: 34
End: 2024-10-01

## 2024-10-01 ENCOUNTER — OUTPATIENT (OUTPATIENT)
Dept: OUTPATIENT SERVICES | Facility: HOSPITAL | Age: 34
LOS: 1 days | End: 2024-10-01
Payer: COMMERCIAL

## 2024-10-01 DIAGNOSIS — F31.9 BIPOLAR DISORDER, UNSPECIFIED: ICD-10-CM

## 2024-10-01 PROCEDURE — 90832 PSYTX W PT 30 MINUTES: CPT

## 2024-10-01 NOTE — REASON FOR VISIT
[Patient preference] : as per patient preference [Continuing, patient not seen in-person within last 12 months (provide details below)] : Telehealth services are continuing, patient not seen in-person within last 12 months.  [Telehealth (audio & video) - Individual/Group] : This visit was provided via telehealth using real-time 2-way audio visual technology. [Other Location: e.g. Home (Enter Location, City,State)___] : The provider was located at [unfilled]. [Home] : The patient, [unfilled], was located at home, [unfilled], at the time of the visit. [Patient's space is appropriate for telehealth and maintains privacy/confidentiality.] : Patient's space is appropriate for telehealth and maintains privacy/confidentiality. [Participant(s) identity verified] : Participant(s) identity verified. [FreeTextEntry4] : 10:30 am [FreeTextEntry5] : 10:50 am [Patient] : Patient [FreeTextEntry1] : Psychotherapy follow up for mood disorder and anxiety

## 2024-10-01 NOTE — PHYSICAL EXAM
[Cooperative] : cooperative [Evasive] : evasive [Constricted] : constricted [Clear] : clear [Linear/Goal Directed] : linear/goal directed [Average] : average [WNL] : within normal limits

## 2024-10-01 NOTE — PLAN
[FreeTextEntry2] :  Treatment Plan is composed with Patent at  session of April 23, 2024  Goal: Improve communication skills  Continued: Need to heal past issues especially regarding repairing and healing the past through healthy behavior [Skills training (all types)] : Skills training (all types)  [Supportive Therapy] : Supportive Therapy [de-identified] :     Bisi states her mood has improved and functioning remains good.  She reports she had a day off today and overslept so she was late for session. She yawned throughout the entire session and seemed distant.  She responded in an evasive manner to the conversation.  Bisi denied any changes or new concerns.  Bisi is provided with active listening and support [FreeTextEntry1] : PLAN: Next session scheduled for 2 weeks Render In Strict Bullet Format?: No Continue Regimen: Betamethasone lotion Apply to rash on scalp nightly as needed.  No more than 2 weeks if used continuously. 1 week off. Repeat if needed. [Not face, groin]. Detail Level: Simple

## 2024-10-15 ENCOUNTER — OUTPATIENT (OUTPATIENT)
Dept: OUTPATIENT SERVICES | Facility: HOSPITAL | Age: 34
LOS: 1 days | End: 2024-10-15
Payer: COMMERCIAL

## 2024-10-15 ENCOUNTER — APPOINTMENT (OUTPATIENT)
Dept: PSYCHIATRY | Facility: CLINIC | Age: 34
End: 2024-10-15

## 2024-10-15 PROCEDURE — 90832 PSYTX W PT 30 MINUTES: CPT

## 2024-10-16 DIAGNOSIS — F31.9 BIPOLAR DISORDER, UNSPECIFIED: ICD-10-CM

## 2024-10-17 ENCOUNTER — OUTPATIENT (OUTPATIENT)
Dept: OUTPATIENT SERVICES | Facility: HOSPITAL | Age: 34
LOS: 1 days | End: 2024-10-17
Payer: COMMERCIAL

## 2024-10-17 ENCOUNTER — APPOINTMENT (OUTPATIENT)
Dept: PSYCHIATRY | Facility: CLINIC | Age: 34
End: 2024-10-17
Payer: COMMERCIAL

## 2024-10-17 DIAGNOSIS — F41.1 GENERALIZED ANXIETY DISORDER: ICD-10-CM

## 2024-10-17 DIAGNOSIS — F41.0 GENERALIZED ANXIETY DISORDER: ICD-10-CM

## 2024-10-17 DIAGNOSIS — F31.9 BIPOLAR DISORDER, UNSPECIFIED: ICD-10-CM

## 2024-10-17 DIAGNOSIS — F43.10 POST-TRAUMATIC STRESS DISORDER, UNSPECIFIED: ICD-10-CM

## 2024-10-17 PROCEDURE — 99214 OFFICE O/P EST MOD 30 MIN: CPT | Mod: 95

## 2024-10-18 DIAGNOSIS — F41.1 GENERALIZED ANXIETY DISORDER: ICD-10-CM

## 2024-10-25 NOTE — RISK ASSESSMENT
yes [No, patient denies ideation or behavior] : No, patient denies ideation or behavior [FreeTextEntry9] : No risk to self or others elicited

## 2024-10-29 ENCOUNTER — APPOINTMENT (OUTPATIENT)
Dept: PSYCHIATRY | Facility: CLINIC | Age: 34
End: 2024-10-29

## 2024-10-29 ENCOUNTER — OUTPATIENT (OUTPATIENT)
Dept: OUTPATIENT SERVICES | Facility: HOSPITAL | Age: 34
LOS: 1 days | End: 2024-10-29
Payer: COMMERCIAL

## 2024-10-29 DIAGNOSIS — F31.9 BIPOLAR DISORDER, UNSPECIFIED: ICD-10-CM

## 2024-10-29 PROCEDURE — 90832 PSYTX W PT 30 MINUTES: CPT

## 2024-10-30 DIAGNOSIS — F31.9 BIPOLAR DISORDER, UNSPECIFIED: ICD-10-CM

## 2024-11-12 ENCOUNTER — APPOINTMENT (OUTPATIENT)
Dept: PSYCHIATRY | Facility: CLINIC | Age: 34
End: 2024-11-12

## 2024-11-12 ENCOUNTER — OUTPATIENT (OUTPATIENT)
Dept: OUTPATIENT SERVICES | Facility: HOSPITAL | Age: 34
LOS: 1 days | End: 2024-11-12
Payer: COMMERCIAL

## 2024-11-12 DIAGNOSIS — F31.9 BIPOLAR DISORDER, UNSPECIFIED: ICD-10-CM

## 2024-11-12 PROCEDURE — 90832 PSYTX W PT 30 MINUTES: CPT

## 2024-11-13 DIAGNOSIS — F31.9 BIPOLAR DISORDER, UNSPECIFIED: ICD-10-CM

## 2024-11-14 ENCOUNTER — APPOINTMENT (OUTPATIENT)
Dept: PSYCHIATRY | Facility: CLINIC | Age: 34
End: 2024-11-14
Payer: COMMERCIAL

## 2024-11-14 ENCOUNTER — OUTPATIENT (OUTPATIENT)
Dept: OUTPATIENT SERVICES | Facility: HOSPITAL | Age: 34
LOS: 1 days | End: 2024-11-14
Payer: COMMERCIAL

## 2024-11-14 DIAGNOSIS — F41.1 GENERALIZED ANXIETY DISORDER: ICD-10-CM

## 2024-11-14 DIAGNOSIS — F41.0 GENERALIZED ANXIETY DISORDER: ICD-10-CM

## 2024-11-14 DIAGNOSIS — F31.9 BIPOLAR DISORDER, UNSPECIFIED: ICD-10-CM

## 2024-11-14 PROCEDURE — 99214 OFFICE O/P EST MOD 30 MIN: CPT | Mod: 95

## 2024-11-15 DIAGNOSIS — F41.1 GENERALIZED ANXIETY DISORDER: ICD-10-CM

## 2024-11-15 DIAGNOSIS — F31.9 BIPOLAR DISORDER, UNSPECIFIED: ICD-10-CM

## 2024-12-03 ENCOUNTER — OUTPATIENT (OUTPATIENT)
Dept: OUTPATIENT SERVICES | Facility: HOSPITAL | Age: 34
LOS: 1 days | End: 2024-12-03
Payer: COMMERCIAL

## 2024-12-03 ENCOUNTER — APPOINTMENT (OUTPATIENT)
Dept: PSYCHIATRY | Facility: CLINIC | Age: 34
End: 2024-12-03

## 2024-12-03 PROCEDURE — 90832 PSYTX W PT 30 MINUTES: CPT

## 2024-12-16 ENCOUNTER — APPOINTMENT (OUTPATIENT)
Dept: PSYCHIATRY | Facility: CLINIC | Age: 34
End: 2024-12-16

## 2024-12-31 ENCOUNTER — APPOINTMENT (OUTPATIENT)
Dept: PSYCHIATRY | Facility: CLINIC | Age: 34
End: 2024-12-31

## 2024-12-31 ENCOUNTER — OUTPATIENT (OUTPATIENT)
Dept: OUTPATIENT SERVICES | Facility: HOSPITAL | Age: 34
LOS: 1 days | End: 2024-12-31
Payer: COMMERCIAL

## 2024-12-31 PROCEDURE — 90832 PSYTX W PT 30 MINUTES: CPT

## 2025-01-01 DIAGNOSIS — F31.9 BIPOLAR DISORDER, UNSPECIFIED: ICD-10-CM

## 2025-01-24 ENCOUNTER — OUTPATIENT (OUTPATIENT)
Dept: OUTPATIENT SERVICES | Facility: HOSPITAL | Age: 35
LOS: 1 days | End: 2025-01-24
Payer: COMMERCIAL

## 2025-01-24 ENCOUNTER — APPOINTMENT (OUTPATIENT)
Dept: PSYCHIATRY | Facility: CLINIC | Age: 35
End: 2025-01-24

## 2025-01-24 DIAGNOSIS — F31.9 BIPOLAR DISORDER, UNSPECIFIED: ICD-10-CM

## 2025-01-24 PROCEDURE — 90832 PSYTX W PT 30 MINUTES: CPT

## 2025-01-25 DIAGNOSIS — F31.9 BIPOLAR DISORDER, UNSPECIFIED: ICD-10-CM

## 2025-01-28 ENCOUNTER — OUTPATIENT (OUTPATIENT)
Dept: OUTPATIENT SERVICES | Facility: HOSPITAL | Age: 35
LOS: 1 days | End: 2025-01-28
Payer: COMMERCIAL

## 2025-01-28 ENCOUNTER — APPOINTMENT (OUTPATIENT)
Dept: PSYCHIATRY | Facility: CLINIC | Age: 35
End: 2025-01-28
Payer: COMMERCIAL

## 2025-01-28 DIAGNOSIS — F43.10 POST-TRAUMATIC STRESS DISORDER, UNSPECIFIED: ICD-10-CM

## 2025-01-28 DIAGNOSIS — F41.1 GENERALIZED ANXIETY DISORDER: ICD-10-CM

## 2025-01-28 DIAGNOSIS — F31.9 BIPOLAR DISORDER, UNSPECIFIED: ICD-10-CM

## 2025-01-28 DIAGNOSIS — F41.0 GENERALIZED ANXIETY DISORDER: ICD-10-CM

## 2025-01-28 PROCEDURE — 98007 SYNCH AUDIO-VIDEO EST HI 40: CPT

## 2025-01-28 PROCEDURE — 99214 OFFICE O/P EST MOD 30 MIN: CPT | Mod: 95

## 2025-01-28 RX ORDER — FLUOXETINE HYDROCHLORIDE 40 MG/1
40 CAPSULE ORAL
Qty: 30 | Refills: 2 | Status: ACTIVE | COMMUNITY
Start: 2025-01-28 | End: 1900-01-01

## 2025-01-29 DIAGNOSIS — F41.1 GENERALIZED ANXIETY DISORDER: ICD-10-CM

## 2025-01-29 DIAGNOSIS — F31.9 BIPOLAR DISORDER, UNSPECIFIED: ICD-10-CM

## 2025-02-11 ENCOUNTER — APPOINTMENT (OUTPATIENT)
Dept: PSYCHIATRY | Facility: CLINIC | Age: 35
End: 2025-02-11

## 2025-02-11 ENCOUNTER — OUTPATIENT (OUTPATIENT)
Dept: OUTPATIENT SERVICES | Facility: HOSPITAL | Age: 35
LOS: 1 days | End: 2025-02-11
Payer: COMMERCIAL

## 2025-02-11 PROCEDURE — 90832 PSYTX W PT 30 MINUTES: CPT

## 2025-02-27 ENCOUNTER — APPOINTMENT (OUTPATIENT)
Dept: PSYCHIATRY | Facility: CLINIC | Age: 35
End: 2025-02-27

## 2025-03-06 ENCOUNTER — APPOINTMENT (OUTPATIENT)
Dept: PSYCHIATRY | Facility: CLINIC | Age: 35
End: 2025-03-06

## 2025-03-06 ENCOUNTER — NON-APPOINTMENT (OUTPATIENT)
Age: 35
End: 2025-03-06

## 2025-03-21 ENCOUNTER — OUTPATIENT (OUTPATIENT)
Dept: OUTPATIENT SERVICES | Facility: HOSPITAL | Age: 35
LOS: 1 days | End: 2025-03-21
Payer: COMMERCIAL

## 2025-03-21 ENCOUNTER — APPOINTMENT (OUTPATIENT)
Dept: PSYCHIATRY | Facility: CLINIC | Age: 35
End: 2025-03-21
Payer: COMMERCIAL

## 2025-03-21 DIAGNOSIS — F31.9 BIPOLAR DISORDER, UNSPECIFIED: ICD-10-CM

## 2025-03-21 DIAGNOSIS — F41.0 GENERALIZED ANXIETY DISORDER: ICD-10-CM

## 2025-03-21 DIAGNOSIS — F41.1 GENERALIZED ANXIETY DISORDER: ICD-10-CM

## 2025-03-21 PROCEDURE — 98007 SYNCH AUDIO-VIDEO EST HI 40: CPT

## 2025-03-21 PROCEDURE — 99214 OFFICE O/P EST MOD 30 MIN: CPT | Mod: 95

## 2025-03-22 DIAGNOSIS — F41.1 GENERALIZED ANXIETY DISORDER: ICD-10-CM

## 2025-04-08 ENCOUNTER — APPOINTMENT (OUTPATIENT)
Dept: PSYCHIATRY | Facility: CLINIC | Age: 35
End: 2025-04-08

## 2025-04-18 ENCOUNTER — OUTPATIENT (OUTPATIENT)
Dept: OUTPATIENT SERVICES | Facility: HOSPITAL | Age: 35
LOS: 1 days | End: 2025-04-18
Payer: COMMERCIAL

## 2025-04-18 ENCOUNTER — APPOINTMENT (OUTPATIENT)
Dept: PSYCHIATRY | Facility: CLINIC | Age: 35
End: 2025-04-18
Payer: COMMERCIAL

## 2025-04-18 DIAGNOSIS — F41.0 GENERALIZED ANXIETY DISORDER: ICD-10-CM

## 2025-04-18 DIAGNOSIS — F41.1 GENERALIZED ANXIETY DISORDER: ICD-10-CM

## 2025-04-18 DIAGNOSIS — F31.9 BIPOLAR DISORDER, UNSPECIFIED: ICD-10-CM

## 2025-04-18 DIAGNOSIS — F41.9 ANXIETY DISORDER, UNSPECIFIED: ICD-10-CM

## 2025-04-18 PROCEDURE — 98007 SYNCH AUDIO-VIDEO EST HI 40: CPT

## 2025-04-18 PROCEDURE — 99214 OFFICE O/P EST MOD 30 MIN: CPT | Mod: 95

## 2025-04-19 DIAGNOSIS — F41.1 GENERALIZED ANXIETY DISORDER: ICD-10-CM

## 2025-04-19 DIAGNOSIS — F31.9 BIPOLAR DISORDER, UNSPECIFIED: ICD-10-CM

## 2025-05-09 ENCOUNTER — APPOINTMENT (OUTPATIENT)
Dept: PSYCHIATRY | Facility: CLINIC | Age: 35
End: 2025-05-09

## 2025-05-20 ENCOUNTER — APPOINTMENT (OUTPATIENT)
Dept: PSYCHIATRY | Facility: CLINIC | Age: 35
End: 2025-05-20

## 2025-05-20 ENCOUNTER — OUTPATIENT (OUTPATIENT)
Dept: OUTPATIENT SERVICES | Facility: HOSPITAL | Age: 35
LOS: 1 days | End: 2025-05-20
Payer: COMMERCIAL

## 2025-05-20 PROCEDURE — 90832 PSYTX W PT 30 MINUTES: CPT

## 2025-05-21 DIAGNOSIS — F31.9 BIPOLAR DISORDER, UNSPECIFIED: ICD-10-CM

## 2025-06-05 ENCOUNTER — OUTPATIENT (OUTPATIENT)
Dept: OUTPATIENT SERVICES | Facility: HOSPITAL | Age: 35
LOS: 1 days | End: 2025-06-05
Payer: COMMERCIAL

## 2025-06-05 ENCOUNTER — APPOINTMENT (OUTPATIENT)
Dept: PSYCHIATRY | Facility: CLINIC | Age: 35
End: 2025-06-05

## 2025-06-05 DIAGNOSIS — F32.3 MAJOR DEPRESSIVE DISORDER, SINGLE EPISODE, SEVERE WITH PSYCHOTIC FEATURES: ICD-10-CM

## 2025-06-05 PROCEDURE — 90832 PSYTX W PT 30 MINUTES: CPT

## 2025-06-05 RX ORDER — FLUOXETINE HYDROCHLORIDE 20 MG/1
20 CAPSULE ORAL
Qty: 7 | Refills: 0 | Status: ACTIVE | COMMUNITY
Start: 2025-06-05 | End: 1900-01-01

## 2025-06-06 DIAGNOSIS — F32.3 MAJOR DEPRESSIVE DISORDER, SINGLE EPISODE, SEVERE WITH PSYCHOTIC FEATURES: ICD-10-CM

## 2025-06-20 ENCOUNTER — APPOINTMENT (OUTPATIENT)
Dept: PSYCHIATRY | Facility: CLINIC | Age: 35
End: 2025-06-20

## 2025-06-24 ENCOUNTER — OUTPATIENT (OUTPATIENT)
Dept: OUTPATIENT SERVICES | Facility: HOSPITAL | Age: 35
LOS: 1 days | End: 2025-06-24
Payer: COMMERCIAL

## 2025-06-24 ENCOUNTER — APPOINTMENT (OUTPATIENT)
Dept: PSYCHIATRY | Facility: CLINIC | Age: 35
End: 2025-06-24
Payer: COMMERCIAL

## 2025-06-24 DIAGNOSIS — F41.1 GENERALIZED ANXIETY DISORDER: ICD-10-CM

## 2025-06-24 DIAGNOSIS — F31.9 BIPOLAR DISORDER, UNSPECIFIED: ICD-10-CM

## 2025-06-24 PROCEDURE — 99214 OFFICE O/P EST MOD 30 MIN: CPT | Mod: 95

## 2025-06-24 PROCEDURE — 98007 SYNCH AUDIO-VIDEO EST HI 40: CPT

## 2025-06-25 DIAGNOSIS — F31.9 BIPOLAR DISORDER, UNSPECIFIED: ICD-10-CM

## 2025-06-25 DIAGNOSIS — F43.10 POST-TRAUMATIC STRESS DISORDER, UNSPECIFIED: ICD-10-CM

## 2025-06-25 DIAGNOSIS — F41.1 GENERALIZED ANXIETY DISORDER: ICD-10-CM

## 2025-07-22 ENCOUNTER — OUTPATIENT (OUTPATIENT)
Dept: OUTPATIENT SERVICES | Facility: HOSPITAL | Age: 35
LOS: 1 days | End: 2025-07-22
Payer: COMMERCIAL

## 2025-07-22 ENCOUNTER — APPOINTMENT (OUTPATIENT)
Dept: PSYCHIATRY | Facility: CLINIC | Age: 35
End: 2025-07-22
Payer: COMMERCIAL

## 2025-07-22 DIAGNOSIS — F41.0 GENERALIZED ANXIETY DISORDER: ICD-10-CM

## 2025-07-22 DIAGNOSIS — F31.9 BIPOLAR DISORDER, UNSPECIFIED: ICD-10-CM

## 2025-07-22 DIAGNOSIS — F43.10 POST-TRAUMATIC STRESS DISORDER, UNSPECIFIED: ICD-10-CM

## 2025-07-22 DIAGNOSIS — F41.1 GENERALIZED ANXIETY DISORDER: ICD-10-CM

## 2025-07-22 PROCEDURE — 99214 OFFICE O/P EST MOD 30 MIN: CPT | Mod: 95

## 2025-07-22 PROCEDURE — 98007 SYNCH AUDIO-VIDEO EST HI 40: CPT

## 2025-07-23 DIAGNOSIS — F41.1 GENERALIZED ANXIETY DISORDER: ICD-10-CM

## 2025-07-23 DIAGNOSIS — F31.9 BIPOLAR DISORDER, UNSPECIFIED: ICD-10-CM

## 2025-07-24 ENCOUNTER — OUTPATIENT (OUTPATIENT)
Dept: OUTPATIENT SERVICES | Facility: HOSPITAL | Age: 35
LOS: 1 days | End: 2025-07-24
Payer: COMMERCIAL

## 2025-07-24 ENCOUNTER — APPOINTMENT (OUTPATIENT)
Dept: PSYCHIATRY | Facility: CLINIC | Age: 35
End: 2025-07-24

## 2025-07-24 PROCEDURE — 90832 PSYTX W PT 30 MINUTES: CPT

## 2025-07-31 ENCOUNTER — APPOINTMENT (OUTPATIENT)
Dept: PSYCHIATRY | Facility: CLINIC | Age: 35
End: 2025-07-31

## 2025-07-31 ENCOUNTER — OUTPATIENT (OUTPATIENT)
Dept: OUTPATIENT SERVICES | Facility: HOSPITAL | Age: 35
LOS: 1 days | End: 2025-07-31
Payer: COMMERCIAL

## 2025-07-31 DIAGNOSIS — F31.9 BIPOLAR DISORDER, UNSPECIFIED: ICD-10-CM

## 2025-07-31 PROCEDURE — 90832 PSYTX W PT 30 MINUTES: CPT

## 2025-08-12 ENCOUNTER — APPOINTMENT (OUTPATIENT)
Dept: PSYCHIATRY | Facility: CLINIC | Age: 35
End: 2025-08-12

## 2025-09-05 ENCOUNTER — OUTPATIENT (OUTPATIENT)
Dept: OUTPATIENT SERVICES | Facility: HOSPITAL | Age: 35
LOS: 1 days | End: 2025-09-05
Payer: COMMERCIAL

## 2025-09-05 ENCOUNTER — APPOINTMENT (OUTPATIENT)
Dept: PSYCHIATRY | Facility: CLINIC | Age: 35
End: 2025-09-05
Payer: COMMERCIAL

## 2025-09-05 DIAGNOSIS — F41.0 PANIC DISORDER [EPISODIC PAROXYSMAL ANXIETY]: ICD-10-CM

## 2025-09-05 DIAGNOSIS — F31.9 BIPOLAR DISORDER, UNSPECIFIED: ICD-10-CM

## 2025-09-05 DIAGNOSIS — F41.1 GENERALIZED ANXIETY DISORDER: ICD-10-CM

## 2025-09-05 DIAGNOSIS — F41.0 GENERALIZED ANXIETY DISORDER: ICD-10-CM

## 2025-09-05 PROCEDURE — 99214 OFFICE O/P EST MOD 30 MIN: CPT | Mod: 95

## 2025-09-05 PROCEDURE — 98007 SYNCH AUDIO-VIDEO EST HI 40: CPT

## 2025-09-06 DIAGNOSIS — F31.9 BIPOLAR DISORDER, UNSPECIFIED: ICD-10-CM

## 2025-09-06 DIAGNOSIS — F41.0 PANIC DISORDER [EPISODIC PAROXYSMAL ANXIETY]: ICD-10-CM

## 2025-09-10 ENCOUNTER — NON-APPOINTMENT (OUTPATIENT)
Age: 35
End: 2025-09-10

## 2025-09-18 ENCOUNTER — APPOINTMENT (OUTPATIENT)
Dept: PSYCHIATRY | Facility: CLINIC | Age: 35
End: 2025-09-18